# Patient Record
Sex: FEMALE | Race: WHITE | Employment: FULL TIME | URBAN - METROPOLITAN AREA
[De-identification: names, ages, dates, MRNs, and addresses within clinical notes are randomized per-mention and may not be internally consistent; named-entity substitution may affect disease eponyms.]

---

## 2021-07-21 ENCOUNTER — OFFICE VISIT (OUTPATIENT)
Dept: FAMILY MEDICINE CLINIC | Age: 41
End: 2021-07-21
Payer: COMMERCIAL

## 2021-07-21 VITALS
HEIGHT: 65 IN | RESPIRATION RATE: 15 BRPM | DIASTOLIC BLOOD PRESSURE: 84 MMHG | OXYGEN SATURATION: 98 % | HEART RATE: 72 BPM | WEIGHT: 164 LBS | SYSTOLIC BLOOD PRESSURE: 126 MMHG | TEMPERATURE: 98 F | BODY MASS INDEX: 27.32 KG/M2

## 2021-07-21 DIAGNOSIS — B36.0 TINEA VERSICOLOR: ICD-10-CM

## 2021-07-21 DIAGNOSIS — Z12.31 ENCOUNTER FOR SCREENING MAMMOGRAM FOR MALIGNANT NEOPLASM OF BREAST: ICD-10-CM

## 2021-07-21 DIAGNOSIS — Z72.0 TOBACCO USE: ICD-10-CM

## 2021-07-21 DIAGNOSIS — J45.20 MILD INTERMITTENT ASTHMA, UNSPECIFIED WHETHER COMPLICATED: ICD-10-CM

## 2021-07-21 DIAGNOSIS — J30.81 ALLERGIC RHINITIS DUE TO ANIMAL HAIR AND DANDER: Primary | ICD-10-CM

## 2021-07-21 PROCEDURE — 99204 OFFICE O/P NEW MOD 45 MIN: CPT | Performed by: INTERNAL MEDICINE

## 2021-07-21 RX ORDER — ALBUTEROL SULFATE 90 UG/1
1 AEROSOL, METERED RESPIRATORY (INHALATION) EVERY 6 HOURS PRN
Qty: 1 INHALER | Refills: 1 | Status: SHIPPED | OUTPATIENT
Start: 2021-07-21

## 2021-07-21 RX ORDER — FLUTICASONE PROPIONATE 110 UG/1
2 AEROSOL, METERED RESPIRATORY (INHALATION) DAILY PRN
Qty: 1 INHALER | Refills: 1 | Status: SHIPPED | OUTPATIENT
Start: 2021-07-21 | End: 2022-07-26

## 2021-07-21 RX ORDER — LEVOCETIRIZINE DIHYDROCHLORIDE 5 MG/1
5 TABLET, FILM COATED ORAL NIGHTLY PRN
Qty: 90 TABLET | Refills: 3 | Status: SHIPPED | OUTPATIENT
Start: 2021-07-21

## 2021-07-21 SDOH — ECONOMIC STABILITY: FOOD INSECURITY: WITHIN THE PAST 12 MONTHS, THE FOOD YOU BOUGHT JUST DIDN'T LAST AND YOU DIDN'T HAVE MONEY TO GET MORE.: NEVER TRUE

## 2021-07-21 SDOH — ECONOMIC STABILITY: FOOD INSECURITY: WITHIN THE PAST 12 MONTHS, YOU WORRIED THAT YOUR FOOD WOULD RUN OUT BEFORE YOU GOT MONEY TO BUY MORE.: NEVER TRUE

## 2021-07-21 ASSESSMENT — SOCIAL DETERMINANTS OF HEALTH (SDOH): HOW HARD IS IT FOR YOU TO PAY FOR THE VERY BASICS LIKE FOOD, HOUSING, MEDICAL CARE, AND HEATING?: NOT HARD AT ALL

## 2021-07-21 ASSESSMENT — PATIENT HEALTH QUESTIONNAIRE - PHQ9
SUM OF ALL RESPONSES TO PHQ QUESTIONS 1-9: 0
1. LITTLE INTEREST OR PLEASURE IN DOING THINGS: 0
SUM OF ALL RESPONSES TO PHQ QUESTIONS 1-9: 0
SUM OF ALL RESPONSES TO PHQ9 QUESTIONS 1 & 2: 0
2. FEELING DOWN, DEPRESSED OR HOPELESS: 0
SUM OF ALL RESPONSES TO PHQ QUESTIONS 1-9: 0

## 2021-07-21 ASSESSMENT — ENCOUNTER SYMPTOMS
ABDOMINAL PAIN: 0
BACK PAIN: 0
EYE PAIN: 0
SHORTNESS OF BREATH: 0

## 2021-07-21 NOTE — PROGRESS NOTES
Subjective:      Patient ID: Sisi Cedeno is a 42 y.o. female who presents today with:  Chief Complaint   Patient presents with   [address]     no PCP noted since childhood        HPI    Seasonal allergies  No longer responsive to zyrtec and claritin  And flonase and allegra don't work wel either  She has intermittent asthma thought 2/2 to pets  She might take a rescue once a month or every 3 weeks. Past Medical History:   Diagnosis Date    Mild intermittent asthma without complication     Tinea versicolor     TMJ (dislocation of temporomandibular joint)      Past Surgical History:   Procedure Laterality Date    BREAST ENHANCEMENT SURGERY      HERNIA REPAIR      HYSTERECTOMY, VAGINAL      WISDOM TOOTH EXTRACTION       Social History     Socioeconomic History    Marital status:      Spouse name: Not on file    Number of children: Not on file    Years of education: Not on file    Highest education level: Not on file   Occupational History    Not on file   Tobacco Use    Smoking status: Current Every Day Smoker     Packs/day: 1.00     Years: 8.00     Pack years: 8.00     Types: Cigarettes    Smokeless tobacco: Never Used   Vaping Use    Vaping Use: Never assessed   Substance and Sexual Activity    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    Drug use: No    Sexual activity: Not Currently     Partners: Male     Comment:     Other Topics Concern    Not on file   Social History Narrative    Not on file     Social Determinants of Health     Financial Resource Strain: Low Risk     Difficulty of Paying Living Expenses: Not hard at all   Food Insecurity: No Food Insecurity    Worried About Running Out of Food in the Last Year: Never true    Roberto of Food in the Last Year: Never true   Transportation Needs:     Lack of Transportation (Medical):      Lack of Transportation (Non-Medical):    Physical Activity:     Days of Exercise per Week:     Minutes of Exercise per Session:    Stress:     Feeling of Stress :    Social Connections:     Frequency of Communication with Friends and Family:     Frequency of Social Gatherings with Friends and Family:     Attends [employer] Services:     Active Member of Clubs or Organizations:     Attends Club or Organization Meetings:     Marital Status:    Intimate Partner Violence:     Fear of Current or Ex-Partner:     Emotionally Abused:     Physically Abused:     Sexually Abused: Allergies   Allergen Reactions    Sulfa Antibiotics     Tramadol      She doesn't think she's allergic      Current Outpatient Medications on File Prior to Visit   Medication Sig Dispense Refill    Multiple Vitamin (MULTIVITAMIN ADULT PO) Take by mouth       No current facility-administered medications on file prior to visit. I have personally reviewed the ROS, PMH, PFH, and social history     Review of Systems   Constitutional: Negative for chills and fever. HENT: Negative for congestion. Eyes: Negative for pain. Respiratory: Negative for shortness of breath. Cardiovascular: Negative for chest pain. Gastrointestinal: Negative for abdominal pain. Genitourinary: Negative for hematuria. Musculoskeletal: Negative for back pain. Allergic/Immunologic: Negative for immunocompromised state. Neurological: Negative for headaches. Psychiatric/Behavioral: Negative for hallucinations. Objective:   /84 (Site: Right Upper Arm, Position: Sitting, Cuff Size: Large Adult)   Pulse 72   Temp 98 °F (36.7 °C) (Tympanic)   Resp 15   Ht 5' 5\" (1.651 m)   Wt 164 lb (74.4 kg)   SpO2 98%   BMI 27.29 kg/m²     Physical Exam  Constitutional:       General: She is not in acute distress. Appearance: Normal appearance. She is not ill-appearing, toxic-appearing or diaphoretic. HENT:      Head: Normocephalic. Neck:      Vascular: No carotid bruit. Cardiovascular:      Rate and Rhythm: Normal rate and regular rhythm. Pulses: Normal pulses. Heart sounds: Normal heart sounds. No murmur heard. No friction rub. No gallop. Pulmonary:      Effort: Pulmonary effort is normal. No respiratory distress. Breath sounds: Normal breath sounds. No wheezing, rhonchi or rales. Abdominal:      General: Abdomen is flat. There is no distension. Palpations: Abdomen is soft. Tenderness: There is no abdominal tenderness. There is no right CVA tenderness, left CVA tenderness, guarding or rebound. Musculoskeletal:      Cervical back: Neck supple. Right lower leg: No edema. Left lower leg: No edema. Skin:     General: Skin is warm. Findings: No erythema or rash. Comments: Scattered hypo pigmented macular rash over back, chest, forearm (on right)    Neurological:      Mental Status: She is alert. Psychiatric:         Mood and Affect: Mood normal.           Assessment:       Diagnosis Orders   1. Allergic rhinitis due to animal hair and dander     2. Mild intermittent asthma, unspecified whether complicated     3. Tinea versicolor           Plan:     vc  Add ics, consider spirometry soon  See orders  Verify fh of her father please. Nicotine patches, wellbutrin, chantix. Discuss at follow up   Start wellbutrin. No orders of the defined types were placed in this encounter. No orders of the defined types were placed in this encounter. On the fence about the covid vaccine, recommend it. Wash mouth after using  Risk of thrush and pneumonia. If rash doesn't improve in 2 weeks call, Sooner if it worsens. If anything should change or worsen call ASAP, don't wait for next scheduled appointment. Return in about 6 weeks (around 9/1/2021) for worsening symptoms, call ASAP for appointment, Chronic condition management/appointment.       Fabio Bob MD

## 2021-07-24 DIAGNOSIS — B36.0 TINEA VERSICOLOR: ICD-10-CM

## 2021-07-25 NOTE — TELEPHONE ENCOUNTER
Rx requested:  Requested Prescriptions     Pending Prescriptions Disp Refills    Terbinafine HCl 1 % SOLN 1 Bottle 0     Sig: Topical terbinafine 1% solution applied twice daily for one week       Last Office Visit:   7/21/2021        Next Visit Date:  Future Appointments   Date Time Provider Cyndie Zheng   9/1/2021 10:45 AM Mary Alvarenga MD [facility]   9/11/2021 10:20 AM [facility]

## 2021-07-26 DIAGNOSIS — B36.0 TINEA VERSICOLOR: ICD-10-CM

## 2021-08-11 RX ORDER — BUPROPION HYDROCHLORIDE 75 MG/1
75 TABLET ORAL DAILY
Qty: 21 TABLET | Refills: 0 | Status: SHIPPED | OUTPATIENT
Start: 2021-08-11 | End: 2021-09-01 | Stop reason: DRUGHIGH

## 2021-09-01 ENCOUNTER — OFFICE VISIT (OUTPATIENT)
Dept: FAMILY MEDICINE CLINIC | Age: 41
End: 2021-09-01
Payer: COMMERCIAL

## 2021-09-01 VITALS
SYSTOLIC BLOOD PRESSURE: 125 MMHG | OXYGEN SATURATION: 97 % | BODY MASS INDEX: 27.32 KG/M2 | RESPIRATION RATE: 15 BRPM | WEIGHT: 164 LBS | HEIGHT: 65 IN | HEART RATE: 67 BPM | TEMPERATURE: 98.4 F | DIASTOLIC BLOOD PRESSURE: 81 MMHG

## 2021-09-01 DIAGNOSIS — R05.9 COUGH: ICD-10-CM

## 2021-09-01 DIAGNOSIS — Z12.72 SCREENING FOR VAGINAL CANCER: ICD-10-CM

## 2021-09-01 DIAGNOSIS — Z72.0 TOBACCO USE: Primary | ICD-10-CM

## 2021-09-01 DIAGNOSIS — H81.11 BENIGN PAROXYSMAL POSITIONAL VERTIGO OF RIGHT EAR: ICD-10-CM

## 2021-09-01 DIAGNOSIS — Z20.822 COVID-19 RULED OUT BY LABORATORY TESTING: ICD-10-CM

## 2021-09-01 DIAGNOSIS — J02.9 SORE THROAT: ICD-10-CM

## 2021-09-01 DIAGNOSIS — J45.20 MILD INTERMITTENT ASTHMA, UNSPECIFIED WHETHER COMPLICATED: ICD-10-CM

## 2021-09-01 PROCEDURE — 99214 OFFICE O/P EST MOD 30 MIN: CPT | Performed by: INTERNAL MEDICINE

## 2021-09-01 RX ORDER — PREDNISONE 10 MG/1
TABLET ORAL
Qty: 21 TABLET | Refills: 0 | Status: SHIPPED | OUTPATIENT
Start: 2021-09-01 | End: 2022-02-03 | Stop reason: CLARIF

## 2021-09-01 RX ORDER — MECLIZINE HYDROCHLORIDE 25 MG/1
25 TABLET ORAL EVERY 8 HOURS PRN
Qty: 30 TABLET | Refills: 0 | Status: SHIPPED | OUTPATIENT
Start: 2021-09-01 | End: 2021-09-11

## 2021-09-01 RX ORDER — PRENATAL VIT 91/IRON/FOLIC/DHA 28-975-200
COMBINATION PACKAGE (EA) ORAL
COMMUNITY
Start: 2021-07-21 | End: 2021-09-01

## 2021-09-01 RX ORDER — BUPROPION HYDROCHLORIDE 150 MG/1
150 TABLET ORAL EVERY MORNING
Qty: 30 TABLET | Refills: 1 | Status: SHIPPED | OUTPATIENT
Start: 2021-09-01 | End: 2021-09-03 | Stop reason: SDUPTHER

## 2021-09-01 ASSESSMENT — ENCOUNTER SYMPTOMS
BACK PAIN: 0
SHORTNESS OF BREATH: 0
EYE PAIN: 0
ABDOMINAL PAIN: 0

## 2021-09-01 NOTE — PROGRESS NOTES
Subjective:      Patient ID: Sisi Melvin is a 42 y.o. female who presents today with:  Chief Complaint   Patient presents with    Follow-up    Rash     mnetions rash is better     Discuss Medications    Flu Vaccine     declined     Pharyngitis     patient notes a raw feeling         HPI     Rash is lighter/better but nto complete gone  REFUSED FLU shot 2021   Sporadically coughing  Throat is raw   Past Medical History:   Diagnosis Date    Mild intermittent asthma without complication     Tinea versicolor     TMJ (dislocation of temporomandibular joint)      Past Surgical History:   Procedure Laterality Date    BREAST ENHANCEMENT SURGERY      HERNIA REPAIR      HYSTERECTOMY, VAGINAL      WISDOM TOOTH EXTRACTION       Social History     Socioeconomic History    Marital status:      Spouse name: Not on file    Number of children: Not on file    Years of education: Not on file    Highest education level: Not on file   Occupational History    Not on file   Tobacco Use    Smoking status: Current Every Day Smoker     Packs/day: 1.00     Years: 8.00     Pack years: 8.00     Types: Cigarettes    Smokeless tobacco: Never Used   Vaping Use    Vaping Use: Never assessed   Substance and Sexual Activity    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    Drug use: No    Sexual activity: Not Currently     Partners: Male     Comment:     Other Topics Concern    Not on file   Social History Narrative    Not on file     Social Determinants of Health     Financial Resource Strain: Low Risk     Difficulty of Paying Living Expenses: Not hard at all   Food Insecurity: No Food Insecurity    Worried About Running Out of Food in the Last Year: Never true    Roberto of Food in the Last Year: Never true   Transportation Needs:     Lack of Transportation (Medical):      Lack of Transportation (Non-Medical):    Physical Activity:     Days of Exercise per Week:     Minutes of Exercise per Session:    Stress:     Feeling of Stress :    Social Connections:     Frequency of Communication with Friends and Family:     Frequency of Social Gatherings with Friends and Family:     Attends Spiritism Services:     Active Member of Clubs or Organizations:     Attends Club or Organization Meetings:     Marital Status:    Intimate Partner Violence:     Fear of Current or Ex-Partner:     Emotionally Abused:     Physically Abused:     Sexually Abused: Allergies   Allergen Reactions    Sulfa Antibiotics     Tramadol      She doesn't think she's allergic      Current Outpatient Medications on File Prior to Visit   Medication Sig Dispense Refill    Terbinafine HCl 1 % SOLN Topical terbinafine 1% solution applied twice daily for one week 1 Bottle 1    Multiple Vitamin (MULTIVITAMIN ADULT PO) Take by mouth      levocetirizine (XYZAL) 5 MG tablet Take 1 tablet by mouth nightly as needed (allergies) 90 tablet 3    albuterol sulfate HFA (VENTOLIN HFA) 108 (90 Base) MCG/ACT inhaler Inhale 1 puff into the lungs every 6 hours as needed for Wheezing or Shortness of Breath 1 Inhaler 1    fluticasone (FLOVENT HFA) 110 MCG/ACT inhaler Inhale 2 puffs into the lungs daily as needed (wheezing or shortness of breath) 1 Inhaler 1     No current facility-administered medications on file prior to visit. I have personally reviewed the ROS, PMH, PFH, and social history     Review of Systems   Constitutional: Negative for chills and fever. HENT: Negative for congestion. Eyes: Negative for pain. Respiratory: Negative for shortness of breath. Cardiovascular: Negative for chest pain. Gastrointestinal: Negative for abdominal pain. Genitourinary: Negative for hematuria. Musculoskeletal: Negative for back pain. Allergic/Immunologic: Negative for immunocompromised state. Neurological: Negative for headaches. Psychiatric/Behavioral: Negative for hallucinations.        Objective:   /81 (Site: Left Upper Arm, Position: Sitting, Cuff Size: Large Adult)   Pulse 67   Temp 98.4 °F (36.9 °C) (Tympanic)   Resp 15   Ht 5' 5\" (1.651 m)   Wt 164 lb (74.4 kg)   SpO2 97%   BMI 27.29 kg/m²     Physical Exam  Constitutional:       General: She is not in acute distress. Appearance: Normal appearance. She is not ill-appearing, toxic-appearing or diaphoretic. HENT:      Head: Normocephalic. Mouth/Throat:      Comments: Uvula is midline and mucous membranes are normal. Posterior oropharynx without erythema. No oropharyngeal exudate, posterior oropharyngeal edema or tonsillar abscesses. Floor of mouth soft. Neck:      Vascular: No carotid bruit. Cardiovascular:      Rate and Rhythm: Normal rate and regular rhythm. Pulses: Normal pulses. Heart sounds: Normal heart sounds. No murmur heard. No friction rub. No gallop. Pulmonary:      Effort: Pulmonary effort is normal. No respiratory distress. Breath sounds: Normal breath sounds. No wheezing, rhonchi or rales. Abdominal:      General: Abdomen is flat. There is no distension. Palpations: Abdomen is soft. Tenderness: There is no abdominal tenderness. There is no right CVA tenderness, left CVA tenderness, guarding or rebound. Musculoskeletal:      Cervical back: Neck supple. Right lower leg: No edema. Left lower leg: No edema. Skin:     General: Skin is warm. Findings: No erythema or rash. Neurological:      Mental Status: She is alert. Cranial Nerves: No cranial nerve deficit. Comments: + LISETTE Mathews pike on right    Psychiatric:         Mood and Affect: Mood normal.           Assessment:       Diagnosis Orders   1. Tobacco use     2. Mild intermittent asthma, unspecified whether complicated     3. Sore throat  XR CHEST (2 VW)    Culture, Throat    POCT COVID-19, Antigen   4.  Cough  XR CHEST (2 VW)   5. Screening for vaginal cancer  Marcelo Culp MD, OB-GYN, [address]: vc  REFUSED covid vaccine 2021   Increase wellbutrin. Trying to verify FH of her dad. Asthma better, taking prn ics (1x per week she needs albuterol)    Trial of meclizine           Orders Placed This Encounter   Procedures    Culture, Throat     Standing Status:   Future     Standing Expiration Date:   9/1/2022    XR CHEST (2 VW)     Standing Status:   Future     Standing Expiration Date:   9/1/2022   Joesph Cruz MD, OB-GYN, [place]     Referral Priority:   Routine     Referral Type:   Eval and Treat     Referral Reason:   Specialty Services Required     Referred to Provider:   Della Hoff MD     Requested Specialty:   Obstetrics & Gynecology     Number of Visits Requested:   1    POCT COVID-19, Antigen     Order Specific Question:   Is this test for diagnosis or screening? Answer:   Diagnosis of ill patient     Order Specific Question:   Symptomatic for COVID-19 as defined by CDC? Answer:   Yes     Order Specific Question:   Date of Symptom Onset     Answer:   8/31/2021     Order Specific Question:   Hospitalized for COVID-19? Answer:   No     Order Specific Question:   Admitted to ICU for COVID-19? Answer:   No     Order Specific Question:   Employed in healthcare setting? Answer:   No     Order Specific Question:   Resident in a congregate (group) care setting? Answer:   No     Order Specific Question:   Pregnant? Answer:   No     Order Specific Question:   Previously tested for COVID-19? Answer:   No     Orders Placed This Encounter   Medications    buPROPion (WELLBUTRIN XL) 150 MG extended release tablet     Sig: Take 1 tablet by mouth every morning     Dispense:  30 tablet     Refill:  1    predniSONE (DELTASONE) 10 MG tablet     Sig: Take 4 tabs po qd x 2 days, then 3 tabs po qd x 2 days, then 2 tabs po qd x 2 days, then 1 tab po qd x 2 days, then 1/2 tab po qd x 2 days.      Dispense:  21 tablet     Refill:  0   Understands SSRI can increase SI and to call immediately if this should occur. No slurred speech, no blurry vision, no new hearing loss, no tinnitus, no focal weakness. No fevers, no chills. If anything should change or worsen call ASAP, don't wait for next scheduled appointment. Return in about 3 months (around 12/1/2021) for Chronic condition management/appointment, worsening symptoms, call ASAP for appointment.       Chidi Purvis MD

## 2021-09-01 NOTE — PATIENT INSTRUCTIONS
Incisional site care: your surgeon stated that site can remain open to air. J-tube site: Your surgeon stated that your site can remain open to air.    Patient Education        Benign Paroxysmal Positional Vertigo (BPPV): Care Instructions  Your Care Instructions     Benign paroxysmal positional vertigo, also called BPPV, is an inner ear problem. It causes a spinning or whirling sensation when you move your head. This sensation is called vertigo. The vertigo usually lasts for less than a minute. People often have vertigo spells for a few days or weeks. Then the vertigo goes away. But it may come back again. The vertigo may be mild, or it may be bad enough to cause unsteadiness, nausea, and vomiting. When you move, your inner ear sends messages to the brain. This helps you keep your balance. Vertigo can happen when debris builds up in the inner ear. The buildup can cause the inner ear to send the wrong message to the brain. Your doctor may move you in different positions to help your vertigo get better faster. This is called the Epley maneuver. Your doctor may also prescribe medicines or exercises to help with your symptoms. Follow-up care is a key part of your treatment and safety. Be sure to make and go to all appointments, and call your doctor if you are having problems. It's also a good idea to know your test results and keep a list of the medicines you take. How can you care for yourself at home? · If your doctor suggests that you do Jennings-Daroff exercises:  ? Sit on the edge of a bed or sofa. Quickly lie down on the side that causes the worst vertigo. Lie on your side with your ear down. ? Stay in this position for at least 30 seconds or until the vertigo goes away. ? Sit up. If this causes vertigo, wait for it to stop. ? Repeat the procedure on the other side. ? Repeat this 10 times. Do these exercises 2 times a day until the vertigo is gone. When should you call for help? Call 911 anytime you think you may need emergency care. For example, call if:    · You have symptoms of a stroke.  These may include:  ? Sudden numbness, tingling, weakness, or loss of movement in your face, arm, or leg, especially on only one side of your body. ? Sudden vision changes. ? Sudden trouble speaking. ? Sudden confusion or trouble understanding simple statements. ? Sudden problems with walking or balance. ? A sudden, severe headache that is different from past headaches. Call your doctor now or seek immediate medical care if:    · You have new or worse nausea and vomiting.     · You have new symptoms such as hearing loss or roaring in your ears. Watch closely for changes in your health, and be sure to contact your doctor if:    · You are not getting better as expected.     · Your vertigo gets worse. Where can you learn more? Go to https://[email or web address].DeliveryChef.[email or web address]. org and sign in to your [employer] account. Enter  in the [employer] box to learn more about \"Benign Paroxysmal Positional Vertigo (BPPV): Care Instructions. \"     If you do not have an account, please click on the \"Sign Up Now\" link. Current as of: December 2, 2020               Content Version: 12.9  © 0794-5869 [employer]. Care instructions adapted under license by [facility] ([facility]). If you have questions about a medical condition or this instruction, always ask your healthcare professional. [place] [address] any warranty or liability for your use of this information.

## 2021-09-03 DIAGNOSIS — F41.9 ANXIETY: Primary | ICD-10-CM

## 2021-09-03 RX ORDER — BUPROPION HYDROCHLORIDE 150 MG/1
150 TABLET ORAL EVERY MORNING
Qty: 30 TABLET | Refills: 1 | Status: SHIPPED | OUTPATIENT
Start: 2021-09-03 | End: 2021-12-02 | Stop reason: SDUPTHER

## 2021-10-04 ENCOUNTER — NURSE TRIAGE (OUTPATIENT)
Dept: OTHER | Facility: CLINIC | Age: 41
End: 2021-10-04

## 2021-10-04 ENCOUNTER — VIRTUAL VISIT (OUTPATIENT)
Dept: FAMILY MEDICINE CLINIC | Age: 41
End: 2021-10-04
Payer: COMMERCIAL

## 2021-10-04 DIAGNOSIS — B96.89 ACUTE BACTERIAL SINUSITIS: Primary | ICD-10-CM

## 2021-10-04 DIAGNOSIS — J01.90 ACUTE BACTERIAL SINUSITIS: Primary | ICD-10-CM

## 2021-10-04 DIAGNOSIS — J01.90 ACUTE SINUSITIS, RECURRENCE NOT SPECIFIED, UNSPECIFIED LOCATION: ICD-10-CM

## 2021-10-04 LAB
INFLUENZA A ANTIBODY: NORMAL
INFLUENZA B ANTIBODY: NORMAL
Lab: NORMAL
PERFORMING INSTRUMENT: NORMAL
QC PASS/FAIL: NORMAL
SARS-COV-2, POC: NORMAL

## 2021-10-04 PROCEDURE — 87426 SARSCOV CORONAVIRUS AG IA: CPT | Performed by: PHYSICIAN ASSISTANT

## 2021-10-04 PROCEDURE — 99441 PR PHYS/QHP TELEPHONE EVALUATION 5-10 MIN: CPT | Performed by: PHYSICIAN ASSISTANT

## 2021-10-04 PROCEDURE — 87804 INFLUENZA ASSAY W/OPTIC: CPT | Performed by: PHYSICIAN ASSISTANT

## 2021-10-04 RX ORDER — AMOXICILLIN 500 MG/1
500 CAPSULE ORAL 2 TIMES DAILY
Qty: 20 CAPSULE | Refills: 0 | Status: SHIPPED | OUTPATIENT
Start: 2021-10-04 | End: 2021-10-14

## 2021-10-04 ASSESSMENT — ENCOUNTER SYMPTOMS
RHINORRHEA: 1
TROUBLE SWALLOWING: 0
BACK PAIN: 0
SHORTNESS OF BREATH: 0
DIARRHEA: 0
ABDOMINAL PAIN: 0
CHEST TIGHTNESS: 0
SINUS PRESSURE: 0
SORE THROAT: 1
VOMITING: 0
COUGH: 1

## 2021-10-04 ASSESSMENT — PATIENT HEALTH QUESTIONNAIRE - PHQ9
SUM OF ALL RESPONSES TO PHQ QUESTIONS 1-9: 0
2. FEELING DOWN, DEPRESSED OR HOPELESS: 0
SUM OF ALL RESPONSES TO PHQ9 QUESTIONS 1 & 2: 0
SUM OF ALL RESPONSES TO PHQ QUESTIONS 1-9: 0
SUM OF ALL RESPONSES TO PHQ QUESTIONS 1-9: 0
1. LITTLE INTEREST OR PLEASURE IN DOING THINGS: 0

## 2021-10-04 NOTE — PROGRESS NOTES
TELEHEALTH EVALUATION -- Audio/Visual (During PGXWA-13 public health emergency)    -   Moody Alegria is a 42 y.o. female being evaluated by a Virtual Visit (video visit) encounter to address concerns as mentioned above. A caregiver was present when appropriate. Due to this being a TeleHealth encounter (During WZKCI-21 public health emergency), evaluation of the following organ systems was limited: Vitals/Constitutional/EENT/Resp/CV/GI//MS/Neuro/Skin/Heme-Lymph-Imm. Pursuant to the emergency declaration under the [address] [place] [address], [address] [place] [address] authority and the [employer] Act, this Virtual Visit was conducted with patient's (and/or legal guardian's) consent, to reduce the patient's risk of exposure to COVID-19 and provide necessary medical care. The patient (and/or legal guardian) has also been advised to contact this office for worsening conditions or problems, and seek emergency medical treatment and/or call 911 if deemed necessary. Patient was contacted and agreed to proceed with a virtual visit via Telephone Visit  The risks and benefits of converting to a virtual visit were discussed in light of the current infectious disease epidemic. Patient also understood that insurance coverage and co-pays are up to their individual insurance plans. Patient was located at their home. Provider was located at their office. 10/4/2021  Verona Becerra (:  1980) has requested an audio/video evaluation for the following concern(s):    HPI  42year old female who presents with complaints of sore throat, nasal congestion, body aches, dizzy, shortness of breath on exertion and a fever with a T-Max of 102. 1. states has had these symptoms since Friday. Review of Systems   Constitutional: Positive for fatigue and fever.  Negative for activity change, appetite change, chills and unexpected weight change. HENT: Positive for congestion, rhinorrhea and sore throat. Negative for drooling, ear pain, nosebleeds, sinus pressure and trouble swallowing. Respiratory: Positive for cough. Negative for chest tightness and shortness of breath. Cardiovascular: Negative for chest pain and leg swelling. Gastrointestinal: Negative for abdominal pain, diarrhea and vomiting. Endocrine: Negative for polydipsia and polyphagia. Genitourinary: Negative for dysuria, flank pain and frequency. Musculoskeletal: Negative for back pain and myalgias. Skin: Negative for pallor and rash. Neurological: Positive for headaches. Negative for syncope and weakness. Hematological: Does not bruise/bleed easily. Psychiatric/Behavioral: Negative for agitation, behavioral problems and confusion. All other systems reviewed and are negative. Prior to Visit Medications    Medication Sig Taking? Authorizing Provider   amoxicillin (AMOXIL) 500 MG capsule Take 1 capsule by mouth 2 times daily for 10 days Yes DIANELYS Guerra   buPROPion (WELLBUTRIN XL) 150 MG extended release tablet Take 1 tablet by mouth every morning Yes Oumar Pino MD   predniSONE (DELTASONE) 10 MG tablet Take 4 tabs po qd x 2 days, then 3 tabs po qd x 2 days, then 2 tabs po qd x 2 days, then 1 tab po qd x 2 days, then 1/2 tab po qd x 2 days.  Yes Oumar Pino MD   Terbinafine HCl 1 % SOLN Topical terbinafine 1% solution applied twice daily for one week Yes Oumar Pino MD   Multiple Vitamin (MULTIVITAMIN ADULT PO) Take by mouth Yes Historical Provider, MD   levocetirizine (XYZAL) 5 MG tablet Take 1 tablet by mouth nightly as needed (allergies) Yes Oumar Pino MD   albuterol sulfate HFA (VENTOLIN HFA) 108 (90 Base) MCG/ACT inhaler Inhale 1 puff into the lungs every 6 hours as needed for Wheezing or Shortness of Breath Yes Oumar Pino MD   fluticasone (FLOVENT HFA) 110 MCG/ACT inhaler Inhale 2 puffs into the lungs daily as needed (wheezing or shortness of breath) Yes Yasir Cade MD       Past Medical History:   Diagnosis Date    Mild intermittent asthma without complication     Tinea versicolor     TMJ (dislocation of temporomandibular joint)      Past Surgical History:   Procedure Laterality Date    BREAST ENHANCEMENT SURGERY      HERNIA REPAIR      HYSTERECTOMY, VAGINAL      WISDOM TOOTH EXTRACTION       Social History     Socioeconomic History    Marital status:      Spouse name: Not on file    Number of children: Not on file    Years of education: Not on file    Highest education level: Not on file   Occupational History    Not on file   Tobacco Use    Smoking status: Current Every Day Smoker     Packs/day: 1.00     Years: 8.00     Pack years: 8.00     Types: Cigarettes    Smokeless tobacco: Never Used   Vaping Use    Vaping Use: Never assessed   Substance and Sexual Activity    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    Drug use: No    Sexual activity: Not Currently     Partners: Male     Comment:     Other Topics Concern    Not on file   Social History Narrative    Not on file     Social Determinants of Health     Financial Resource Strain: Low Risk     Difficulty of Paying Living Expenses: Not hard at all   Food Insecurity: No Food Insecurity    Worried About Running Out of Food in the Last Year: Never true    [address] in the Last Year: Never true   Transportation Needs:     Lack of Transportation (Medical):      Lack of Transportation (Non-Medical):    Physical Activity:     Days of Exercise per Week:     Minutes of Exercise per Session:    Stress:     Feeling of Stress :    Social Connections:     Frequency of Communication with Friends and Family:     Frequency of Social Gatherings with Friends and Family:     Attends [employer] Services:     Active Member of Clubs or Organizations:     Attends Club or Organization Meetings:     Marital Status: Abnormal-     Other pertinent observable physical exam findings-   Results for orders placed or performed in visit on 09/01/21   Culture, Throat    Specimen: Throat   Result Value Ref Range    Throat Culture Usual respiratory bib in 48 hours    Vitamin D 25 Hydroxy   Result Value Ref Range    Vit D, 25-Hydroxy 43.3 30.0 - 100.0 ng/mL   TSH with Reflex   Result Value Ref Range    TSH 1.030 0.440 - 3.860 uIU/mL   Lipid Panel   Result Value Ref Range    Cholesterol, Total 160 0 - 199 mg/dL    Triglycerides 63 0 - 150 mg/dL    HDL 55 40 - 59 mg/dL    LDL Calculated 92 0 - 129 mg/dL   Hemoglobin A1C   Result Value Ref Range    Hemoglobin A1C 5.6 4.8 - 5.9 %   Comprehensive Metabolic Panel   Result Value Ref Range    Sodium 143 135 - 144 mEq/L    Potassium 3.9 3.4 - 4.9 mEq/L    Chloride 105 95 - 107 mEq/L    CO2 26 20 - 31 mEq/L    Anion Gap 12 9 - 15 mEq/L    Glucose 84 70 - 99 mg/dL    BUN 10 6 - 20 mg/dL    CREATININE 0.58 0.50 - 0.90 mg/dL    GFR Non-African American >60.0 >60    GFR  >60.0 >60    Calcium 9.9 8.5 - 9.9 mg/dL    Total Protein 6.9 6.3 - 8.0 g/dL    Albumin 4.5 3.5 - 4.6 g/dL    Total Bilirubin <0.2 0.2 - 0.7 mg/dL    Alkaline Phosphatase 80 40 - 130 U/L    ALT 13 0 - 33 U/L    AST 13 0 - 35 U/L    Globulin 2.4 2.3 - 3.5 g/dL   CBC Auto Differential   Result Value Ref Range    WBC 6.1 4.8 - 10.8 K/uL    RBC 4.59 4.20 - 5.40 M/uL    Hemoglobin 13.7 12.0 - 16.0 g/dL    Hematocrit 40.4 37.0 - 47.0 %    MCV 88.1 82.0 - 100.0 fL    MCH 29.9 27.0 - 31.3 pg    MCHC 34.0 33.0 - 37.0 %    RDW 13.2 11.5 - 14.5 %    Platelets 209 008 - 171 K/uL    Neutrophils % 64.4 %    Lymphocytes % 26.5 %    Monocytes % 6.7 %    Eosinophils % 1.6 %    Basophils % 0.8 %    Neutrophils Absolute 3.9 1.4 - 6.5 K/uL    Lymphocytes Absolute 1.6 1.0 - 4.8 K/uL    Monocytes Absolute 0.4 0.2 - 0.8 K/uL    Eosinophils Absolute 0.1 0.0 - 0.7 K/uL    Basophils Absolute 0.0 0.0 - 0.2 K/uL   COVID-19   Result Value Ref Range    SARS-CoV-2, PCR Not Detected Not Detected       ASSESSMENT/PLAN:  Assessment & Plan   Jona Lara was seen today for pharyngitis, nasal congestion and fever. Diagnoses and all orders for this visit:    Acute bacterial sinusitis  -     POCT COVID-19, Antigen  -     POCT Influenza A/B  -     amoxicillin (AMOXIL) 500 MG capsule; Take 1 capsule by mouth 2 times daily for 10 days    Acute sinusitis, recurrence not specified, unspecified location  -     POCT COVID-19, Antigen  -     POCT Influenza A/B  -     amoxicillin (AMOXIL) 500 MG capsule; Take 1 capsule by mouth 2 times daily for 10 days      Orders Placed This Encounter   Procedures    POCT COVID-19, Antigen     Order Specific Question:   Is this test for diagnosis or screening? Answer:   Diagnosis of ill patient     Order Specific Question:   Symptomatic for COVID-19 as defined by CDC? Answer:   Yes     Order Specific Question:   Date of Symptom Onset     Answer:   10/8/2021     Order Specific Question:   Hospitalized for COVID-19? Answer:   No     Order Specific Question:   Admitted to ICU for COVID-19? Answer:   No     Order Specific Question:   Employed in healthcare setting? Answer:   No     Order Specific Question:   Resident in a congregate (group) care setting? Answer:   No     Order Specific Question:   Pregnant? Answer:   No     Order Specific Question:   Previously tested for COVID-19? Answer: Yes    POCT Influenza A/B     Orders Placed This Encounter   Medications    amoxicillin (AMOXIL) 500 MG capsule     Sig: Take 1 capsule by mouth 2 times daily for 10 days     Dispense:  20 capsule     Refill:  0     There are no discontinued medications. Return if symptoms worsen or fail to improve. Reviewed with the patient: current clinical status, medications, activities and diet.      Side effects, adverse effects of the medication prescribed today, as well as treatment plan/ rationale and result expectations have been discussed with the patient who expresses understanding and desires to proceed. Close follow up to evaluate treatment results and for coordination of care. I have reviewed the patient's medical history in detail and updated the computerized patient record. Patient identification was verified at the start of the visit: Yes    Total time spent on this encounter: Not billed by time      --DIANELYS Valdez on 10/4/2021 at 6:00 PM    An electronic signature was used to authenticate this note.

## 2021-10-04 NOTE — TELEPHONE ENCOUNTER
[facility] [address] [facility]. Brief description of triage: flu like symptoms, negative covid test at urgent care today. Triage indicates for patient to be seen tomorrow. Care advice provided, patient verbalizes understanding; denies any other questions or concerns; instructed to call back for any new or worsening symptoms. Writer provided warm transfer to [facility] for appointment scheduling. Patient disconnected prior to transfer, [facility] states will call patient. Attention Provider: Thank you for allowing me to participate in the care of your patient. The patient was connected to triage in response to information provided to the [facility]. Please do not respond through this encounter as the response is not directed to a shared pool. Reason for Disposition   Patient wants to be seen    Answer Assessment - Initial Assessment Questions  1. WORST SYMPTOM: \"What is your worst symptom? \" (e.g., cough, runny nose, muscle aches, headache, sore throat, fever)       Dizzy    2. ONSET: \"When did your flu symptoms start? \"       Friday evening 9/30/2021    3. COUGH: \"How bad is the cough? \"        Yes, dry    4. RESPIRATORY DISTRESS: \"Describe your breathing. \"       Yes, with activity    5. FEVER: \"Do you have a fever? \" If so, ask: \"What is your temperature, how was it measured, and when did it start? \"      101.2 this am forehead thermometer, 99.3 at urgent care    6. EXPOSURE: \"Were you exposed to someone with influenza? \"        3 group members positive, together on 9/20/2021    7. FLU VACCINE: \"Did you get a flu shot this year? \"      Denies    8. HIGH RISK DISEASE: \"Do you any chronic medical problems? \" (e.g., heart or lung disease, asthma, weak immune system, or other HIGH RISK conditions)      Denies    9. PREGNANCY: \"Is there any chance you are pregnant? \" \"When was your last menstrual period? \"      Hysterectomy    10.  OTHER SYMPTOMS: \"Do you have any other symptoms? \"  (e.g., runny nose, muscle aches, headache, sore throat)        Denies    Protocols used: INFLUENZA - SEASONAL-ADULT-OH

## 2021-12-02 DIAGNOSIS — F41.9 ANXIETY: ICD-10-CM

## 2021-12-02 RX ORDER — BUPROPION HYDROCHLORIDE 150 MG/1
150 TABLET ORAL EVERY MORNING
Qty: 30 TABLET | Refills: 1 | Status: SHIPPED | OUTPATIENT
Start: 2021-12-02 | End: 2022-02-03 | Stop reason: DRUGHIGH

## 2022-02-03 ENCOUNTER — VIRTUAL VISIT (OUTPATIENT)
Dept: FAMILY MEDICINE CLINIC | Age: 42
End: 2022-02-03
Payer: COMMERCIAL

## 2022-02-03 DIAGNOSIS — Z72.0 TOBACCO USE: ICD-10-CM

## 2022-02-03 DIAGNOSIS — R05.9 COUGH: Primary | ICD-10-CM

## 2022-02-03 PROCEDURE — 99213 OFFICE O/P EST LOW 20 MIN: CPT | Performed by: INTERNAL MEDICINE

## 2022-02-03 RX ORDER — BUPROPION HYDROCHLORIDE 300 MG/1
300 TABLET ORAL EVERY MORNING
Qty: 90 TABLET | Refills: 1 | Status: SHIPPED | OUTPATIENT
Start: 2022-02-03 | End: 2022-07-26 | Stop reason: ALTCHOICE

## 2022-02-03 ASSESSMENT — PATIENT HEALTH QUESTIONNAIRE - PHQ9
SUM OF ALL RESPONSES TO PHQ QUESTIONS 1-9: 0
SUM OF ALL RESPONSES TO PHQ QUESTIONS 1-9: 0
2. FEELING DOWN, DEPRESSED OR HOPELESS: 0
1. LITTLE INTEREST OR PLEASURE IN DOING THINGS: 0
SUM OF ALL RESPONSES TO PHQ QUESTIONS 1-9: 0
SUM OF ALL RESPONSES TO PHQ QUESTIONS 1-9: 0
SUM OF ALL RESPONSES TO PHQ9 QUESTIONS 1 & 2: 0

## 2022-02-03 NOTE — PATIENT INSTRUCTIONS
Patient Education        bupropion  Pronunciation:  byoo PRO pee on  Brand:  Aplenzin, Forfivo XL, Wellbutrin SR, Wellbutrin XL, Zyban Advantage Pack  What is the most important information I should know about bupropion? You should not take bupropion if you have seizures or an eating disorder, or if you have suddenly stopped using alcohol, seizure medication, or sedatives. If you take Wellbutrin for depression, do not also take Zyban to quit smoking. Do not use bupropion within 14 days before or 14 days after you have used an MAO inhibitor, such as isocarboxazid, linezolid, methylene blue injection, phenelzine, rasagiline, selegiline, or tranylcypromine. Some young people have thoughts about suicide when first taking an antidepressant. Stay alert to changes in your mood or symptoms. Report any new or worsening symptoms to your doctor. What is bupropion? Bupropion is an antidepressant used to treat major depressive disorder and seasonal affective disorder. The Zyban brand of bupropion is used to help people stop smoking by reducing cravings and other withdrawal effects. Bupropion may also be used for purposes not listed in this medication guide. What should I discuss with my healthcare provider before taking bupropion? You should not take bupropion if you are allergic to it, or if you have:  · a seizure disorder;  · an eating disorder such as anorexia or bulimia; or  · if you have suddenly stopped using alcohol, seizure medication, or a sedative (such as Xanax, Valium, Fiorinal, Klonopin, and others). Do not use an MAO inhibitor within 14 days before or 14 days after you take bupropion. A dangerous drug interaction could occur. MAO inhibitors include isocarboxazid, linezolid, phenelzine, rasagiline, selegiline, and tranylcypromine. Do not take bupropion to treat more than one condition at a time. If you take bupropion for depression, do not also take this medicine to quit smoking.    Bupropion may cause seizures, especially if you have certain medical conditions or use certain drugs. Tell your doctor about all of your medical conditions and the drugs you use. Tell your doctor if you have ever had:  · a head injury, seizures, or brain or spinal cord tumor;  · narrow-angle glaucoma;  · heart disease, high blood pressure, or a heart attack;  · diabetes;  · kidney or liver disease (especially cirrhosis);  · depression, bipolar disorder, or other mental illness; or  · if you drink alcohol. Some young people have thoughts about suicide when first taking an antidepressant. Your doctor will need to check your progress at regular visits. Your family or other caregivers should also be alert to changes in your mood or symptoms. Ask your doctor about taking this medicine if you are pregnant. It is not known whether bupropion will harm an unborn baby. However, you may have a relapse of depression if you stop taking your antidepressant. Tell your doctor right away if you become pregnant. Do not start or stop taking bupropion without your doctor's advice. If you are pregnant, your name may be listed on a pregnancy registry to track the effects of bupropion on the baby. It may not be safe to breastfeed while using this medicine. Ask your doctor about any risk. Bupropion is not approved for use by anyone younger than 25years old. How should I take bupropion? Follow all directions on your prescription label and read all medication guides or instruction sheets. Your doctor may occasionally change your dose. Use the medicine exactly as directed. Too much of this medicine can increase your risk of a seizure. You may take bupropion with or without food. Swallow the extended-release tablet whole and do not crush, chew, or break it. You should not change your dose or stop using bupropion suddenly, unless you have a seizure while taking this medicine. Stopping suddenly can cause unpleasant withdrawal symptoms.  Ask your doctor how to safely stop using bupropion. If you take Zyban to help you stop smoking, you may continue to smoke for about 1 week after you start the medicine. Set a date to quit smoking during the first 2 weeks of treatment. Talk to your doctor if you have trouble quitting after taking Zyban for 7 to 12 weeks. Your doctor may prescribe a nicotine replacement product (such as patches or gum) to help you stop smoking. Start using the nicotine replacement product on the same day you stop (quit) smoking or using tobacco products. Some people taking bupropion (Wellbutrin or Zyban) have had high blood pressure that is severe, especially when also using a nicotine replacement product (patch or gum). Your blood pressure may need to be checked before and during treatment with bupropion. Read and carefully follow any Instructions for Use provided with your medicine. Ask your doctor or pharmacist if you do not understand these instructions. You may have nicotine withdrawal symptoms when you stop smoking, including: increased appetite, weight gain, trouble sleeping, trouble concentrating, slower heart rate, having the urge to smoke, and feeling anxious, restless, depressed, angry, frustrated, or irritated. These symptoms may occur with or without using medication such as Zyban. Smoking cessation may also cause new or worsening mental health problems, such as depression. This medicine may affect a drug-screening urine test and you may have false results. Tell the laboratory staff that you use bupropion. Store at room temperature away from moisture, heat, and light. What happens if I miss a dose? Skip the missed dose and use your next dose at the regular time. Do not use two doses at one time. What happens if I overdose? Seek emergency medical attention or call the Poison Help line at 1-952.359.6348.  An overdose of bupropion can be fatal.  Overdose symptoms may include muscle stiffness, hallucinations, fast or uneven urination;  · headache, dizziness; or  · muscle or joint pain. This is not a complete list of side effects and others may occur. Call your doctor for medical advice about side effects. You may report side effects to FDA at 5-583-TAO-1731. What other drugs will affect bupropion? You may have a higher risk of seizures if you use certain other medicines while taking bupropion. Many drugs can affect bupropion. This includes prescription and over-the-counter medicines, vitamins, and herbal products. Not all possible interactions are listed here. Tell your doctor about all your current medicines and any medicine you start or stop using. Where can I get more information? Your pharmacist can provide more information about bupropion. Remember, keep this and all other medicines out of the reach of children, never share your medicines with others, and use this medication only for the indication prescribed. Every effort has been made to ensure that the information provided by [address] is accurate, up-to-date, and complete, but no guarantee is made to that effect. Drug information contained herein may be time sensitive. [employer] information has been compiled for use by healthcare practitioners and consumers in the [place] and therefore [employer] does not warrant that uses outside of the [place] are appropriate, unless specifically indicated otherwise. [employer] drug information does not endorse drugs, diagnose patients or recommend therapy. Photos to [employer] drug information is an informational resource designed to assist licensed healthcare practitioners in caring for their patients and/or to serve consumers viewing this service as a supplement to, and not a substitute for, the expertise, skill, knowledge and judgment of healthcare practitioners.  The absence of a warning for a given drug or drug combination in no way should be construed to indicate that the drug or drug combination is safe, effective or appropriate for any given patient. [facility] does not assume any responsibility for any aspect of healthcare administered with the aid of information [facility] provides. The information contained herein is not intended to cover all possible uses, directions, precautions, warnings, drug interactions, allergic reactions, or adverse effects. If you have questions about the drugs you are taking, check with your doctor, nurse or pharmacist.  Copyright 5834-1350 [address]: 24.01. Revision date: 1/27/2020. Care instructions adapted under license by [facility] ([facility]). If you have questions about a medical condition or this instruction, always ask your healthcare professional. [place] [address] any warranty or liability for your use of this information.

## 2022-02-03 NOTE — PROGRESS NOTES
Subjective:      Patient ID: Yulisa Finn is a 36 y.o. female who presents today with:  No chief complaint on file. HPI      Here for follow up   Dizziness is better   Past Medical History:   Diagnosis Date    Mild intermittent asthma without complication     Tinea versicolor     TMJ (dislocation of temporomandibular joint)      Past Surgical History:   Procedure Laterality Date    BREAST ENHANCEMENT SURGERY      HERNIA REPAIR      HYSTERECTOMY, VAGINAL      WISDOM TOOTH EXTRACTION       Social History     Socioeconomic History    Marital status:      Spouse name: Not on file    Number of children: Not on file    Years of education: Not on file    Highest education level: Not on file   Occupational History    Not on file   Tobacco Use    Smoking status: Current Every Day Smoker     Packs/day: 1.00     Years: 8.00     Pack years: 8.00     Types: Cigarettes    Smokeless tobacco: Never Used   Vaping Use    Vaping Use: Not on file   Substance and Sexual Activity    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    Drug use: No    Sexual activity: Not Currently     Partners: Male     Comment:     Other Topics Concern    Not on file   Social History Narrative    Not on file     Social Determinants of Health     Financial Resource Strain: Low Risk     Difficulty of Paying Living Expenses: Not hard at all   Food Insecurity: No Food Insecurity    Worried About Running Out of Food in the Last Year: Never true    Roberto of Food in the Last Year: Never true   Transportation Needs:     Lack of Transportation (Medical): Not on file    Lack of Transportation (Non-Medical):  Not on file   Physical Activity:     Days of Exercise per Week: Not on file    Minutes of Exercise per Session: Not on file   Stress:     Feeling of Stress : Not on file   Social Connections:     Frequency of Communication with Friends and Family: Not on file    Frequency of Social Gatherings with Friends and Family: Not on file    Attends Hinduism Services: Not on file    Active Member of Clubs or Organizations: Not on file    Attends Club or Organization Meetings: Not on file    Marital Status: Not on file   Intimate Partner Violence:     Fear of Current or Ex-Partner: Not on file    Emotionally Abused: Not on file    Physically Abused: Not on file    Sexually Abused: Not on file   Housing Stability:     Unable to Pay for Housing in the Last Year: Not on file    Number of [place] in the Last Year: Not on file    Unstable Housing in the Last Year: Not on file     Allergies   Allergen Reactions    Sulfa Antibiotics     Tramadol      She doesn't think she's allergic      Current Outpatient Medications on File Prior to Visit   Medication Sig Dispense Refill    Multiple Vitamin (MULTIVITAMIN ADULT PO) Take by mouth      levocetirizine (XYZAL) 5 MG tablet Take 1 tablet by mouth nightly as needed (allergies) 90 tablet 3    albuterol sulfate HFA (VENTOLIN HFA) 108 (90 Base) MCG/ACT inhaler Inhale 1 puff into the lungs every 6 hours as needed for Wheezing or Shortness of Breath 1 Inhaler 1    fluticasone (FLOVENT HFA) 110 MCG/ACT inhaler Inhale 2 puffs into the lungs daily as needed (wheezing or shortness of breath) 1 Inhaler 1     No current facility-administered medications on file prior to visit. I have personally reviewed the ROS, PMH, PFH, and social history     Review of Systems    Objective: There were no vitals taken for this visit. Physical Exam  Constitutional:       General: She is not in acute distress. Appearance: She is not ill-appearing, toxic-appearing or diaphoretic. Pulmonary:      Effort: Pulmonary effort is normal. No respiratory distress. Assessment:       Diagnosis Orders   1. Cough  XR CHEST (2 VW)   2. Tobacco use  buPROPion (WELLBUTRIN XL) 300 MG extended release tablet         Plan:      Video  visit done because of coronavirus pandemic. Doxy[employer]me used  explained billeable visit, patient understands and agrees to continue  I was at home and patient was at home during this visit. Refused flu and covid vaccines  Mom with breast cancer, patient will do mammogram  Referred to ob/gyn 9/2021 (vag pap)     Trying to verify Clifton Kim of her dad. (Not done yet)   Increase Wellbutrin. She will contact me if she wants nicotine lozenge   [facility] 09/2023   Asthma better, taking prn ics (1x to at most 2x per week she needs albuterol 2/3/2022)                 Orders Placed This Encounter   Procedures    XR CHEST (2 VW)     Standing Status:   Future     Standing Expiration Date:   8/3/2022     Orders Placed This Encounter   Medications    buPROPion (WELLBUTRIN XL) 300 MG extended release tablet     Sig: Take 1 tablet by mouth every morning     Dispense:  90 tablet     Refill:  1   had a dry cough since  covid   If not going away within the next several weeks then we need cxr and fu  If it worsens then call asap   Understands SSRI can increase SI and to call immediately if this should occur. Patient was offered pregnancy test, she declined, she understands risks of birth defects. Fluticasone is prn  If asthma controls worsens call and start to fluticasone scheduled  Being around her dogs worsens her asthma. If anything should change or worsen call ASAP, don't wait for next scheduled appointment. Return in about 3 months (around 5/3/2022) for Chronic condition management/appointment, worsening symptoms, call ASAP for appointment.       Heri Rosales MD

## 2022-04-12 ENCOUNTER — PATIENT MESSAGE (OUTPATIENT)
Dept: FAMILY MEDICINE CLINIC | Age: 42
End: 2022-04-12

## 2022-04-13 NOTE — TELEPHONE ENCOUNTER
She can take it every other day for now and then we can stop it at her follow up (she may get withdrawal if she doesn't)  Thanks,    Call immediately should something change.      Araceli Bassett

## 2022-04-13 NOTE — TELEPHONE ENCOUNTER
I would prefer she come to walk in clinic to be safe  If she can't or refuses then pend amoxicillin 500 mg tid

## 2022-07-07 ENCOUNTER — OFFICE VISIT (OUTPATIENT)
Dept: FAMILY MEDICINE CLINIC | Age: 42
End: 2022-07-07
Payer: COMMERCIAL

## 2022-07-07 VITALS
HEIGHT: 65 IN | DIASTOLIC BLOOD PRESSURE: 78 MMHG | TEMPERATURE: 98.4 F | SYSTOLIC BLOOD PRESSURE: 104 MMHG | OXYGEN SATURATION: 98 % | WEIGHT: 174.4 LBS | HEART RATE: 64 BPM | BODY MASS INDEX: 29.06 KG/M2 | RESPIRATION RATE: 16 BRPM

## 2022-07-07 DIAGNOSIS — R91.1 PULMONARY NODULE: Primary | ICD-10-CM

## 2022-07-07 DIAGNOSIS — R07.81 RIB PAIN ON LEFT SIDE: Primary | ICD-10-CM

## 2022-07-07 PROCEDURE — 99213 OFFICE O/P EST LOW 20 MIN: CPT | Performed by: PHYSICIAN ASSISTANT

## 2022-07-07 RX ORDER — CYCLOBENZAPRINE HCL 10 MG
10 TABLET ORAL 3 TIMES DAILY PRN
Qty: 30 TABLET | Refills: 0 | Status: SHIPPED | OUTPATIENT
Start: 2022-07-07 | End: 2022-07-17

## 2022-07-07 ASSESSMENT — PATIENT HEALTH QUESTIONNAIRE - PHQ9
8. MOVING OR SPEAKING SO SLOWLY THAT OTHER PEOPLE COULD HAVE NOTICED. OR THE OPPOSITE, BEING SO FIGETY OR RESTLESS THAT YOU HAVE BEEN MOVING AROUND A LOT MORE THAN USUAL: 0
9. THOUGHTS THAT YOU WOULD BE BETTER OFF DEAD, OR OF HURTING YOURSELF: 0
4. FEELING TIRED OR HAVING LITTLE ENERGY: 0
6. FEELING BAD ABOUT YOURSELF - OR THAT YOU ARE A FAILURE OR HAVE LET YOURSELF OR YOUR FAMILY DOWN: 0
10. IF YOU CHECKED OFF ANY PROBLEMS, HOW DIFFICULT HAVE THESE PROBLEMS MADE IT FOR YOU TO DO YOUR WORK, TAKE CARE OF THINGS AT HOME, OR GET ALONG WITH OTHER PEOPLE: 0
SUM OF ALL RESPONSES TO PHQ QUESTIONS 1-9: 0
1. LITTLE INTEREST OR PLEASURE IN DOING THINGS: 0
SUM OF ALL RESPONSES TO PHQ9 QUESTIONS 1 & 2: 0
3. TROUBLE FALLING OR STAYING ASLEEP: 0
7. TROUBLE CONCENTRATING ON THINGS, SUCH AS READING THE NEWSPAPER OR WATCHING TELEVISION: 0
SUM OF ALL RESPONSES TO PHQ QUESTIONS 1-9: 0
SUM OF ALL RESPONSES TO PHQ QUESTIONS 1-9: 0
5. POOR APPETITE OR OVEREATING: 0
2. FEELING DOWN, DEPRESSED OR HOPELESS: 0
SUM OF ALL RESPONSES TO PHQ QUESTIONS 1-9: 0

## 2022-07-07 ASSESSMENT — ENCOUNTER SYMPTOMS
DIARRHEA: 0
SINUS PRESSURE: 0
TROUBLE SWALLOWING: 0
ABDOMINAL PAIN: 0
BACK PAIN: 0
SHORTNESS OF BREATH: 0
VOMITING: 0
CHEST TIGHTNESS: 0
COUGH: 0

## 2022-07-07 NOTE — PROGRESS NOTES
Subjective:      Patient ID: Nash Das is a 36 y.o. female who presents today for:  Chief Complaint   Patient presents with    Breast Pain     states she has pain in her breast that goes from underneath her left breast into her nipple- states it started a week ago- also stated she does have breast implants       HPI  36year old female who presents with complaints of pain under her left breast. States it started one week ago after her  accidentally applied to much pressure to the area. Past Medical History:   Diagnosis Date    Mild intermittent asthma without complication     Tinea versicolor     TMJ (dislocation of temporomandibular joint)      Past Surgical History:   Procedure Laterality Date    BREAST ENHANCEMENT SURGERY      HERNIA REPAIR      HYSTERECTOMY, VAGINAL      WISDOM TOOTH EXTRACTION       Social History     Socioeconomic History    Marital status:      Spouse name: Not on file    Number of children: Not on file    Years of education: Not on file    Highest education level: Not on file   Occupational History    Not on file   Tobacco Use    Smoking status: Current Every Day Smoker     Packs/day: 1.00     Years: 8.00     Pack years: 8.00     Types: Cigarettes    Smokeless tobacco: Never Used   Vaping Use    Vaping Use: Not on file   Substance and Sexual Activity    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    Drug use: No    Sexual activity: Not Currently     Partners: Male     Comment:     Other Topics Concern    Not on file   Social History Narrative    Not on file     Social Determinants of Health     Financial Resource Strain: Low Risk     Difficulty of Paying Living Expenses: Not hard at all   Food Insecurity: No Food Insecurity    Worried About Running Out of Food in the Last Year: Never true    Roberto of Food in the Last Year: Never true   Transportation Needs:     Lack of Transportation (Medical):  Not on file    Lack of Transportation (Non-Medical):  Not on file   Physical Activity:     Days of Exercise per Week: Not on file    Minutes of Exercise per Session: Not on file   Stress:     Feeling of Stress : Not on file   Social Connections:     Frequency of Communication with Friends and Family: Not on file    Frequency of Social Gatherings with Friends and Family: Not on file    Attends Yazdanism Services: Not on file    Active Member of Clubs or Organizations: Not on file    Attends Club or Organization Meetings: Not on file    Marital Status: Not on file   Intimate Partner Violence:     Fear of Current or Ex-Partner: Not on file    Emotionally Abused: Not on file    Physically Abused: Not on file    Sexually Abused: Not on file   Housing Stability:     Unable to Pay for Housing in the Last Year: Not on file    Number of [place] in the Last Year: Not on file    Unstable Housing in the Last Year: Not on file     Family History   Problem Relation Age of Onset    Cancer Mother         breast cancer    Other Sister         NO KNOWN ANEURYSMS IN THE FAMILY    Other Maternal Grandmother         no known congenital heart disease in family     Allergies   Allergen Reactions    Sulfa Antibiotics     Tramadol      She doesn't think she's allergic      Current Outpatient Medications   Medication Sig Dispense Refill    cyclobenzaprine (FLEXERIL) 10 MG tablet Take 1 tablet by mouth 3 times daily as needed for Muscle spasms 30 tablet 0    Multiple Vitamin (MULTIVITAMIN ADULT PO) Take by mouth      levocetirizine (XYZAL) 5 MG tablet Take 1 tablet by mouth nightly as needed (allergies) 90 tablet 3    albuterol sulfate HFA (VENTOLIN HFA) 108 (90 Base) MCG/ACT inhaler Inhale 1 puff into the lungs every 6 hours as needed for Wheezing or Shortness of Breath 1 Inhaler 1    fluticasone (FLOVENT HFA) 110 MCG/ACT inhaler Inhale 2 puffs into the lungs daily as needed (wheezing or shortness of breath) 1 Inhaler 1    buPROPion (WELLBUTRIN XL) 300 MG extended release tablet Take 1 tablet by mouth every morning (Patient not taking: Reported on 7/7/2022) 90 tablet 1     No current facility-administered medications for this visit. Review of Systems   Constitutional: Negative for activity change, appetite change, chills, fever and unexpected weight change. HENT: Negative for drooling, ear pain, nosebleeds, sinus pressure and trouble swallowing. Respiratory: Negative for cough, chest tightness and shortness of breath. Cardiovascular: Negative for chest pain and leg swelling. Gastrointestinal: Negative for abdominal pain, diarrhea and vomiting. Endocrine: Negative for polydipsia and polyphagia. Genitourinary: Negative for dysuria, flank pain and frequency. Musculoskeletal: Positive for arthralgias (left ribs). Negative for back pain and myalgias. Skin: Negative for pallor and rash. Neurological: Negative for syncope, weakness and headaches. Hematological: Does not bruise/bleed easily. Psychiatric/Behavioral: Negative for agitation, behavioral problems and confusion. All other systems reviewed and are negative. Objective:   /78 (Site: Right Upper Arm, Position: Sitting, Cuff Size: Medium Adult)   Pulse 64   Temp 98.4 °F (36.9 °C) (Temporal)   Resp 16   Ht 5' 5\" (1.651 m)   Wt 174 lb 6.4 oz (79.1 kg)   SpO2 98%   BMI 29.02 kg/m²     Physical Exam  Vitals and nursing note reviewed. Constitutional:       General: She is awake. She is not in acute distress. Appearance: Normal appearance. She is well-developed and normal weight. She is not ill-appearing, toxic-appearing or diaphoretic. Comments: No photophobia. No phonophobia. HENT:      Head: Normocephalic and atraumatic. No Garcia's sign. Right Ear: External ear normal.      Left Ear: External ear normal.      Nose: Nose normal. No congestion or rhinorrhea.       Mouth/Throat:      Mouth: Mucous membranes are moist.      Pharynx: Oropharynx is clear. No oropharyngeal exudate or posterior oropharyngeal erythema. Eyes:      General: No scleral icterus. Right eye: No foreign body or discharge. Left eye: No discharge. Extraocular Movements: Extraocular movements intact. Conjunctiva/sclera: Conjunctivae normal.      Left eye: No exudate. Pupils: Pupils are equal, round, and reactive to light. Neck:      Vascular: No JVD. Trachea: No tracheal deviation. Comments: No meningismus. Cardiovascular:      Rate and Rhythm: Normal rate and regular rhythm. Heart sounds: Normal heart sounds. Heart sounds not distant. No murmur heard. No friction rub. No gallop. Pulmonary:      Effort: Pulmonary effort is normal. No respiratory distress. Breath sounds: Normal breath sounds. No stridor. No wheezing, rhonchi or rales. Chest:      Chest wall: No tenderness. Abdominal:      General: Abdomen is flat. Bowel sounds are normal. There is no distension. Palpations: Abdomen is soft. There is no mass. Tenderness: There is no abdominal tenderness. There is no right CVA tenderness, left CVA tenderness, guarding or rebound. Hernia: No hernia is present. Musculoskeletal:         General: No swelling, tenderness, deformity or signs of injury. Normal range of motion. Cervical back: Normal range of motion and neck supple. No rigidity. Lymphadenopathy:      Head:      Right side of head: No submental adenopathy. Left side of head: No submental adenopathy. Skin:     General: Skin is warm and dry. Capillary Refill: Capillary refill takes less than 2 seconds. Coloration: Skin is not jaundiced or pale. Findings: No bruising, erythema, lesion or rash. Neurological:      General: No focal deficit present. Mental Status: She is alert and oriented to person, place, and time. Mental status is at baseline. Cranial Nerves: No cranial nerve deficit.       Sensory: No sensory deficit. Motor: No weakness. Coordination: Coordination normal.      Deep Tendon Reflexes: Reflexes are normal and symmetric. Psychiatric:         Mood and Affect: Mood normal.         Behavior: Behavior normal. Behavior is cooperative. Thought Content: Thought content normal.         Judgment: Judgment normal.         Assessment:       Diagnosis Orders   1. Rib pain on left side  XR RIBS LEFT INCLUDE CHEST (MIN 3 VIEWS)    cyclobenzaprine (FLEXERIL) 10 MG tablet     No results found for this visit on 07/07/22. Plan:     Assessment & Plan   Sindi Jeter was seen today for breast pain. Diagnoses and all orders for this visit:    Rib pain on left side  -     XR RIBS LEFT INCLUDE CHEST (MIN 3 VIEWS); Future  -     cyclobenzaprine (FLEXERIL) 10 MG tablet; Take 1 tablet by mouth 3 times daily as needed for Muscle spasms      Orders Placed This Encounter   Procedures    XR RIBS LEFT INCLUDE CHEST (MIN 3 VIEWS)     Standing Status:   Future     Standing Expiration Date:   7/7/2023     Order Specific Question:   Reason for exam:     Answer:   r/o fx     Orders Placed This Encounter   Medications    cyclobenzaprine (FLEXERIL) 10 MG tablet     Sig: Take 1 tablet by mouth 3 times daily as needed for Muscle spasms     Dispense:  30 tablet     Refill:  0     There are no discontinued medications. Return if symptoms worsen or fail to improve. Reviewed with the patient/family: current clinical status & medications. Side effects of the medication prescribed today, as well as treatment plan/rationale and result expectations have been discussed with the patient/family who expresses understanding. Patient will be discharged home in stable condition. Follow up with PCP to evaluate treatment results or return if symptoms worsen or fail to improve. Discussed signs and symptoms which require immediate follow-up in ED/call to 911. Understanding verbalized.      I have reviewed the patient's medical history in detail and updated the computerized patient record.     DIANELYS Gates

## 2022-07-07 NOTE — PATIENT INSTRUCTIONS
Patient Education        Musculoskeletal Chest Pain: Care Instructions  Your Care Instructions     Chest pain is not always a sign that something is wrong with your heart or that you have another serious problem. The doctor thinks your chest pain is caused by strained muscles or ligaments, inflamed chest cartilage, or another problem in your chest, rather than by your heart. You may need more tests to find thecause of your chest pain. Follow-up care is a key part of your treatment and safety. Be sure to make and go to all appointments, and call your doctor if you are having problems. It's also a good idea to know your test results and keep alist of the medicines you take. How can you care for yourself at home?  Take pain medicines exactly as directed. ? If the doctor gave you a prescription medicine for pain, take it as prescribed. ? If you are not taking a prescription pain medicine, ask your doctor if you can take an over-the-counter medicine.  Rest and protect the sore area.  Stop, change, or take a break from any activity that may be causing your pain or soreness.  Put ice or a cold pack on the sore area for 10 to 20 minutes at a time. Try to do this every 1 to 2 hours for the next 3 days (when you are awake) or until the swelling goes down. Put a thin cloth between the ice and your skin.  After 2 or 3 days, apply a heating pad set on low or a warm cloth to the area that hurts. Some doctors suggest that you go back and forth between hot and cold.  Do not wrap or tape your ribs for support. This may cause you to take smaller breaths, which could increase your risk of lung problems.  Mentholated creams such as Bengay or Icy Hot may soothe sore muscles. Follow the instructions on the package.  Follow your doctor's instructions for exercising.  Gentle stretching and massage may help you get better faster.  Stretch slowly to the point just before pain begins, and hold the stretch for at least 15 to 30 seconds. Do this 3 or 4 times a day. Stretch just after you have applied heat.  As your pain gets better, slowly return to your normal activities. Any increased pain may be a sign that you need to rest a while longer. When should you call for help? Call 911 anytime you think you may need emergency care. For example, call if:     You have chest pain or pressure. This may occur with:  ? Sweating. ? Shortness of breath. ? Nausea or vomiting. ? Pain that spreads from the chest to the neck, jaw, or one or both shoulders or arms. ? Dizziness or lightheadedness. ? A fast or uneven pulse. After calling 911, chew 1 adult-strength aspirin. Wait for an ambulance. Do not try to drive yourself.      You have sudden chest pain and shortness of breath, or you cough up blood. Call your doctor now or seek immediate medical care if:     You have any trouble breathing.      Your chest pain gets worse.      Your chest pain occurs consistently with exercise and is relieved by rest.   Watch closely for changes in your health, and be sure to contact your doctor if:     Your chest pain does not get better after 1 week. Where can you learn more? Go to https://[employer]. org and sign in to your [employer] account. Enter [id number] in the docTrackr box to learn more about \"Musculoskeletal Chest Pain: Care Instructions. \"     If you do not have an account, please click on the \"Sign Up Now\" link. Current as of: March 9, 2022               Content Version: 13.3  © 2006-2022 [employer], [email or web address]. Care instructions adapted under license by [facility] ([facility]). If you have questions about a medical condition or this instruction, always ask your healthcare professional. [place] [address] any warranty or liability for your use of this information.

## 2022-07-26 ENCOUNTER — OFFICE VISIT (OUTPATIENT)
Dept: PULMONOLOGY | Age: 42
End: 2022-07-26
Payer: COMMERCIAL

## 2022-07-26 VITALS
OXYGEN SATURATION: 98 % | WEIGHT: 174.4 LBS | DIASTOLIC BLOOD PRESSURE: 80 MMHG | HEIGHT: 65 IN | SYSTOLIC BLOOD PRESSURE: 115 MMHG | BODY MASS INDEX: 29.06 KG/M2 | HEART RATE: 79 BPM

## 2022-07-26 DIAGNOSIS — Z72.0 TOBACCO ABUSE: ICD-10-CM

## 2022-07-26 DIAGNOSIS — R91.1 PULMONARY NODULE: Primary | ICD-10-CM

## 2022-07-26 DIAGNOSIS — R06.02 SHORTNESS OF BREATH: ICD-10-CM

## 2022-07-26 PROCEDURE — 99204 OFFICE O/P NEW MOD 45 MIN: CPT | Performed by: INTERNAL MEDICINE

## 2022-07-26 ASSESSMENT — ENCOUNTER SYMPTOMS
RHINORRHEA: 0
EYE DISCHARGE: 0
WHEEZING: 1
VOICE CHANGE: 0
ABDOMINAL PAIN: 0
VOMITING: 0
CHEST TIGHTNESS: 0
DIARRHEA: 0
COUGH: 1
TROUBLE SWALLOWING: 0
SINUS PRESSURE: 0
SORE THROAT: 0
NAUSEA: 0
SHORTNESS OF BREATH: 1
EYE ITCHING: 0

## 2022-07-26 NOTE — PROGRESS NOTES
Subjective:     Jae Escobar is a 36 y.o. female who complains today of:     Chief Complaint   Patient presents with    Results     Lung nodule. HPI  She was having pain under left breath for over 1 month . She had xray with rib  series 3 weeks ago  and found small nodule. She was sent here for lung nodule. She feels like more with movement on rt. or left side and taking deep breath. She was given muscle relaxant and took motrin . She smoke 1 ppd. She said mom has breath cancer , biological father and grand father has lung cancer. C/o shortness of breath with exertion. Off and on Wheezing. Dry Cough , any time . No Hemoptysis. No Chest tightness. No Chest pain with radiation  or pleuritic pain. No  leg edema. No orthopnea. No Fever or chills. No Rhinorrhea and postnasal drip.     She is using bronchodilator with albuterol HFA prn       Allergies:  Sulfa antibiotics and Tramadol  Past Medical History:   Diagnosis Date    Mild intermittent asthma without complication     Tinea versicolor     TMJ (dislocation of temporomandibular joint)      Past Surgical History:   Procedure Laterality Date    BREAST ENHANCEMENT SURGERY      HERNIA REPAIR      HYSTERECTOMY, VAGINAL      WISDOM TOOTH EXTRACTION       Family History   Problem Relation Age of Onset    Cancer Mother         breast cancer    Other Sister         NO KNOWN ANEURYSMS IN THE FAMILY    Other Maternal Grandmother         no known congenital heart disease in family     Social History     Socioeconomic History    Marital status:      Spouse name: Not on file    Number of children: Not on file    Years of education: Not on file    Highest education level: Not on file   Occupational History    Not on file   Tobacco Use    Smoking status: Every Day     Packs/day: 1.00     Years: 8.00     Pack years: 8.00     Types: Cigarettes    Smokeless tobacco: Never   Vaping Use    Vaping Use: Not on file   Substance and Sexual Activity    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    Drug use: No    Sexual activity: Not Currently     Partners: Male     Comment:     Other Topics Concern    Not on file   Social History Narrative    Not on file     Social Determinants of Health     Financial Resource Strain: Not on file   Food Insecurity: Not on file   Transportation Needs: Not on file   Physical Activity: Not on file   Stress: Not on file   Social Connections: Not on file   Intimate Partner Violence: Not on file   Housing Stability: Not on file         Review of Systems   Constitutional:  Negative for chills, diaphoresis, fatigue and fever. HENT:  Negative for congestion, mouth sores, nosebleeds, postnasal drip, rhinorrhea, sinus pressure, sneezing, sore throat, trouble swallowing and voice change. Eyes:  Negative for discharge, itching and visual disturbance. Respiratory:  Positive for cough, shortness of breath and wheezing. Negative for chest tightness. Cardiovascular:  Negative for chest pain, palpitations and leg swelling. Gastrointestinal:  Negative for abdominal pain, diarrhea, nausea and vomiting. Genitourinary:  Negative for difficulty urinating and hematuria. Musculoskeletal:  Negative for arthralgias, joint swelling and myalgias. Skin:  Negative for rash. Allergic/Immunologic: Negative for environmental allergies and food allergies. Neurological:  Negative for dizziness, tremors, weakness and headaches. Psychiatric/Behavioral:  Negative for behavioral problems and sleep disturbance.        :     Vitals:    07/26/22 1028   BP: 115/80   Site: Right Upper Arm   Position: Sitting   Cuff Size: Medium Adult   Pulse: 79   SpO2: 98%   Weight: 174 lb 6.4 oz (79.1 kg)   Height: 5' 5\" (1.651 m)     Wt Readings from Last 3 Encounters:   07/26/22 174 lb 6.4 oz (79.1 kg)   07/07/22 174 lb 6.4 oz (79.1 kg)   09/01/21 164 lb (74.4 kg)         Physical Exam  Constitutional:       General: She is not in acute distress. Appearance: She is well-developed. She is not diaphoretic. HENT:      Head: Normocephalic and atraumatic. Nose: Nose normal.   Eyes:      Pupils: Pupils are equal, round, and reactive to light. Neck:      Thyroid: No thyromegaly. Vascular: No JVD. Trachea: No tracheal deviation. Cardiovascular:      Rate and Rhythm: Normal rate and regular rhythm. Heart sounds: No murmur heard. No friction rub. No gallop. Pulmonary:      Effort: No respiratory distress. Breath sounds: No wheezing or rales. Chest:      Chest wall: No tenderness. Abdominal:      General: There is no distension. Tenderness: There is no abdominal tenderness. There is no rebound. Musculoskeletal:         General: Normal range of motion. Lymphadenopathy:      Cervical: No cervical adenopathy. Skin:     General: Skin is warm and dry. Neurological:      Mental Status: She is alert and oriented to person, place, and time. Coordination: Coordination normal.   Psychiatric:         Mood and Affect: Mood normal.         Behavior: Behavior normal.       Current Outpatient Medications   Medication Sig Dispense Refill    Multiple Vitamin (MULTIVITAMIN ADULT PO) Take by mouth      levocetirizine (XYZAL) 5 MG tablet Take 1 tablet by mouth nightly as needed (allergies) 90 tablet 3    albuterol sulfate HFA (VENTOLIN HFA) 108 (90 Base) MCG/ACT inhaler Inhale 1 puff into the lungs every 6 hours as needed for Wheezing or Shortness of Breath 1 Inhaler 1    fluticasone (FLOVENT HFA) 110 MCG/ACT inhaler Inhale 2 puffs into the lungs daily as needed (wheezing or shortness of breath) 1 Inhaler 1     No current facility-administered medications for this visit. Assessment/Plan:     1. Pulmonary nodule  She was having pain under left breath for over 1 month . She had x ray with rib  series 3 weeks ago  and found small nodule. She was sent here for lung nodule. She feels like more with movement on rt.  or left side and taking deep breath. She was given muscle relaxant and took motrin . She smoke 1 ppd. She said mom has breath cancer , biological father and grand father has lung cancer. I recommend CT chest for further evaluation     - CT CHEST WO CONTRAST; Future    2. Shortness of breath  C/o shortness of breath with exertion. Off and on Wheezing. Dry Cough , any time . She is using bronchodilator with albuterol HFA prn   - Full PFT Study With Bronchodilator; Future    3. Tobacco abuse  She is advised try to quit smoking. Risks related to smoking explained to the patient. Different ways to help to quit smoking were discussed today. she is smoking 1 ppd      Return in about 4 weeks (around 8/23/2022) for shortness of breath, Ct chest f/u, pft result, lung nodule f/u.       Carley Martínez MD

## 2022-08-09 ENCOUNTER — HOSPITAL ENCOUNTER (OUTPATIENT)
Dept: PULMONOLOGY | Age: 42
Discharge: HOME | End: 2022-08-09
Payer: COMMERCIAL

## 2022-08-09 ENCOUNTER — HOSPITAL ENCOUNTER (OUTPATIENT)
Dept: CT IMAGING | Age: 42
Discharge: HOME | End: 2022-08-11
Payer: COMMERCIAL

## 2022-08-09 DIAGNOSIS — R06.02 SHORTNESS OF BREATH: ICD-10-CM

## 2022-08-09 DIAGNOSIS — R91.1 PULMONARY NODULE: ICD-10-CM

## 2022-08-09 PROCEDURE — 94010 BREATHING CAPACITY TEST: CPT

## 2022-08-09 PROCEDURE — 94729 DIFFUSING CAPACITY: CPT

## 2022-08-09 PROCEDURE — 94726 PLETHYSMOGRAPHY LUNG VOLUMES: CPT

## 2022-08-09 PROCEDURE — 71250 CT THORAX DX C-: CPT

## 2022-08-10 PROCEDURE — 94726 PLETHYSMOGRAPHY LUNG VOLUMES: CPT | Performed by: INTERNAL MEDICINE

## 2022-08-10 PROCEDURE — 94729 DIFFUSING CAPACITY: CPT | Performed by: INTERNAL MEDICINE

## 2022-08-10 PROCEDURE — 94010 BREATHING CAPACITY TEST: CPT | Performed by: INTERNAL MEDICINE

## 2022-08-10 NOTE — PROCEDURES
[address]                      [facility], [address] [facility]                    PULMONARY FUNCTION  Miguel Grieves   36 y.o.   female  Height 65 in  Weight 174 lb      Referring provider   Beronica Mcelroy MD    Reading provider   Kalpesh Fernandez MD    Test meets ATS criteria for acceptability and reproducibility Yes    Diagnosis: QIU: Yes  Cough   Yes, wheezing Yes  Smoking   yes    Spirometry   FVC            3.99 L   105%   FEV1          3.29 L  107%   FEV1/FVC  82  %               PET46-15% 3.44 L  111%       Lung volume   SVC           3.71 L  98%   RV              2.34 L 137%   TLC            6.05  L 116%   RV/TLC      38 %    DLCO           124 %     Test interpretation     Spirometry is normal  Lung volume shows air trapping  Diffusion capacity is normal       Clinical correlation is recommended     Kalpesh Fernandez MD [facility], 8/10/2022 11:41 AM

## 2022-08-25 ENCOUNTER — OFFICE VISIT (OUTPATIENT)
Dept: PULMONOLOGY | Age: 42
End: 2022-08-25
Payer: COMMERCIAL

## 2022-08-25 VITALS
DIASTOLIC BLOOD PRESSURE: 70 MMHG | HEART RATE: 73 BPM | SYSTOLIC BLOOD PRESSURE: 118 MMHG | OXYGEN SATURATION: 98 % | HEIGHT: 66 IN | WEIGHT: 174.5 LBS | BODY MASS INDEX: 28.04 KG/M2

## 2022-08-25 DIAGNOSIS — R91.1 PULMONARY NODULE: Primary | ICD-10-CM

## 2022-08-25 DIAGNOSIS — R06.02 SHORTNESS OF BREATH: ICD-10-CM

## 2022-08-25 DIAGNOSIS — Z72.0 TOBACCO ABUSE: ICD-10-CM

## 2022-08-25 PROCEDURE — 99214 OFFICE O/P EST MOD 30 MIN: CPT | Performed by: INTERNAL MEDICINE

## 2022-08-25 RX ORDER — NICOTINE 21 MG/24HR
1 PATCH, TRANSDERMAL 24 HOURS TRANSDERMAL EVERY 24 HOURS
Qty: 30 PATCH | Refills: 0 | Status: SHIPPED | OUTPATIENT
Start: 2022-08-25 | End: 2023-08-25

## 2022-08-25 ASSESSMENT — ENCOUNTER SYMPTOMS
CHEST TIGHTNESS: 0
EYE DISCHARGE: 0
SHORTNESS OF BREATH: 1
WHEEZING: 1
DIARRHEA: 0
NAUSEA: 0
ABDOMINAL PAIN: 0
VOMITING: 0
VOICE CHANGE: 0
SORE THROAT: 0
SINUS PRESSURE: 0
COUGH: 1
RHINORRHEA: 0
TROUBLE SWALLOWING: 0
EYE ITCHING: 0

## 2022-08-25 NOTE — PROGRESS NOTES
Comment: sober since 2013    Drug use: No    Sexual activity: Not Currently     Partners: Male     Comment:     Other Topics Concern    Not on file   Social History Narrative    Not on file     Social Determinants of Health     Financial Resource Strain: Not on file   Food Insecurity: Not on file   Transportation Needs: Not on file   Physical Activity: Not on file   Stress: Not on file   Social Connections: Not on file   Intimate Partner Violence: Not on file   Housing Stability: Not on file         Review of Systems   Constitutional:  Negative for chills, diaphoresis, fatigue and fever. HENT:  Negative for congestion, mouth sores, nosebleeds, postnasal drip, rhinorrhea, sinus pressure, sneezing, sore throat, trouble swallowing and voice change. Eyes:  Negative for discharge, itching and visual disturbance. Respiratory:  Positive for cough, shortness of breath and wheezing. Negative for chest tightness. Cardiovascular:  Negative for chest pain, palpitations and leg swelling. Gastrointestinal:  Negative for abdominal pain, diarrhea, nausea and vomiting. Genitourinary:  Negative for difficulty urinating and hematuria. Musculoskeletal:  Negative for arthralgias, joint swelling and myalgias. Skin:  Negative for rash. Allergic/Immunologic: Negative for environmental allergies and food allergies. Neurological:  Negative for dizziness, tremors, weakness and headaches. Psychiatric/Behavioral:  Negative for behavioral problems and sleep disturbance.        :     Vitals:    08/25/22 0946   BP: 118/70   Pulse: 73   SpO2: 98%   Weight: 174 lb 8 oz (79.2 kg)   Height: 5' 5.5\" (1.664 m)     Wt Readings from Last 3 Encounters:   08/25/22 174 lb 8 oz (79.2 kg)   07/26/22 174 lb 6.4 oz (79.1 kg)   07/07/22 174 lb 6.4 oz (79.1 kg)         Physical Exam  Constitutional:       General: She is not in acute distress. Appearance: She is well-developed. She is not diaphoretic.    HENT:      Head: Normocephalic and atraumatic. Nose: Nose normal.   Eyes:      Pupils: Pupils are equal, round, and reactive to light. Neck:      Thyroid: No thyromegaly. Vascular: No JVD. Trachea: No tracheal deviation. Cardiovascular:      Rate and Rhythm: Normal rate and regular rhythm. Heart sounds: No murmur heard. No friction rub. No gallop. Pulmonary:      Effort: No respiratory distress. Breath sounds: No wheezing or rales. Chest:      Chest wall: No tenderness. Abdominal:      General: There is no distension. Tenderness: There is no abdominal tenderness. There is no rebound. Musculoskeletal:         General: Normal range of motion. Lymphadenopathy:      Cervical: No cervical adenopathy. Skin:     General: Skin is warm and dry. Neurological:      Mental Status: She is alert and oriented to person, place, and time. Coordination: Coordination normal.   Psychiatric:         Mood and Affect: Mood normal.         Behavior: Behavior normal.       Current Outpatient Medications   Medication Sig Dispense Refill    nicotine (NICODERM CQ) 21 MG/24HR Place 1 patch onto the skin every 24 hours 30 patch 0    Multiple Vitamin (MULTIVITAMIN ADULT PO) Take by mouth      levocetirizine (XYZAL) 5 MG tablet Take 1 tablet by mouth nightly as needed (allergies) 90 tablet 3    albuterol sulfate HFA (VENTOLIN HFA) 108 (90 Base) MCG/ACT inhaler Inhale 1 puff into the lungs every 6 hours as needed for Wheezing or Shortness of Breath 1 Inhaler 1    fluticasone (FLOVENT HFA) 110 MCG/ACT inhaler Inhale 2 puffs into the lungs daily as needed (wheezing or shortness of breath) 1 Inhaler 1     No current facility-administered medications for this visit.        No results found for this or any previous visit.  ]  No results found for this or any previous visit.  ]  No results found for this or any previous visit.  ]  Results for orders placed during the hospital encounter of 08/09/22    CT CHEST WO CONTRAST    Narrative  EXAMINATION:  CT SCAN CHEST    CLINICAL HISTORY:  Abnormal chest x-ray, follow    COMPARISON:  Chest x-ray/3 of July 7, 2022 1218 hours    TECHNIQUE:  Multiple serial axial images of the chest from the base neck through the upper abdomen with both sagittal coronal reconstruction was without intravenous or oral administration of contrast.    FINDINGS:  Small biapical areas of scarring and fibrosis. The right lung shows no focal parenchymal abnormalities. No pleural effusions. No pneumothoraces. The left lung shows no focal parenchymal abnormalities. No pleural effusions. No pneumothoraces. There is a calcified granuloma approximately 3 to 4 mm in the superior aspect of the left lower lobe. No significant periaortic, pretracheal, parahilar or subcarinal adenopathy. The field-of-view there is a small hiatal hernia. .    Field-of-view there are bilateral breast implants. Multilevel degenerative changes superimposed upon a mild dorsal kyphosis of the thoracic spine. IMPR ION:  Unremarkable CT scan the chest as described above      All CT scans at this facility use dose modulation, iterative reconstruction, and/or weight based dosing when appropriate to reduce radiation dose to as low as reasonably achievable.  ]  No results found for this or any previous visit.  ]    Assessment/Plan:     1. Pulmonary nodule  She had x ray with rib  series and found small nodule. He underwent for CT chest for further evaluation Small biapical areas of scarring and fibrosis. The right lung shows no focal parenchymal abnormalities. No pleural effusions. No pneumothoraces. The left lung shows no focal parenchymal abnormalities. No pleural effusions. No pneumothoraces. There is a calcified granuloma approximately 3 to 4 mm in the superior aspect of the left lower lobe. 2. Tobacco abuse  She smoke 1 ppd. She want to try to quit , wellbutrin did not work, never tried nicoderm patch or chantix.   - nicotine (NICODERM CQ) 21 MG/24HR; Place 1 patch onto the skin every 24 hours  Dispense: 30 patch; Refill: 0  She had a PFT done, PFT normal , mild air trapping. C/o shortness of breath with exertion. Off and on Wheezing. Dry Cough , any time . She is using bronchodilator with albuterol HFA prn. Continue same    Return in about 1 year (around 8/25/2023) for shortness of breath.       Jack Laughlin MD

## 2023-06-26 DIAGNOSIS — Z72.0 TOBACCO ABUSE: ICD-10-CM

## 2023-06-26 NOTE — TELEPHONE ENCOUNTER
Please approve or deny this request.    Rx requested:  Requested Prescriptions     Pending Prescriptions Disp Refills    nicotine (NICODERM CQ) 21 MG/24HR 30 patch 0     Sig: Place 1 patch onto the skin every 24 hours         Last Office Visit:   8/25/2022      Next Visit Date:  No future appointments.

## 2023-06-27 RX ORDER — NICOTINE 21 MG/24HR
1 PATCH, TRANSDERMAL 24 HOURS TRANSDERMAL EVERY 24 HOURS
Qty: 30 PATCH | Refills: 0 | OUTPATIENT
Start: 2023-06-27 | End: 2024-06-26

## 2023-06-27 NOTE — TELEPHONE ENCOUNTER
Rx requested:  Requested Prescriptions     Pending Prescriptions Disp Refills    nicotine (NICODERM CQ) 21 MG/24HR 30 patch 0     Sig: Place 1 patch onto the skin every 24 hours       Last Office Visit:   8/25/2022      Next Visit Date:  Future Appointments   Date Time Provider [address]   8/28/2023 10:30 AM Odalys Page MD [facility]

## 2023-10-13 ENCOUNTER — OFFICE VISIT (OUTPATIENT)
Dept: FAMILY MEDICINE CLINIC | Age: 43
End: 2023-10-13
Payer: COMMERCIAL

## 2023-10-13 VITALS
OXYGEN SATURATION: 98 % | HEIGHT: 66 IN | SYSTOLIC BLOOD PRESSURE: 132 MMHG | TEMPERATURE: 98 F | BODY MASS INDEX: 28.93 KG/M2 | RESPIRATION RATE: 16 BRPM | HEART RATE: 73 BPM | DIASTOLIC BLOOD PRESSURE: 70 MMHG | WEIGHT: 180 LBS

## 2023-10-13 DIAGNOSIS — R50.9 FEVER, UNSPECIFIED FEVER CAUSE: ICD-10-CM

## 2023-10-13 DIAGNOSIS — J01.10 ACUTE NON-RECURRENT FRONTAL SINUSITIS: Primary | ICD-10-CM

## 2023-10-13 LAB
INFLUENZA A ANTIBODY: NEGATIVE
INFLUENZA B ANTIBODY: NEGATIVE
Lab: NORMAL
PERFORMING INSTRUMENT: NORMAL
QC PASS/FAIL: NORMAL
S PYO AG THROAT QL: NORMAL
SARS-COV-2, POC: NORMAL

## 2023-10-13 PROCEDURE — 87426 SARSCOV CORONAVIRUS AG IA: CPT | Performed by: NURSE PRACTITIONER

## 2023-10-13 PROCEDURE — 99213 OFFICE O/P EST LOW 20 MIN: CPT | Performed by: NURSE PRACTITIONER

## 2023-10-13 PROCEDURE — 87880 STREP A ASSAY W/OPTIC: CPT | Performed by: NURSE PRACTITIONER

## 2023-10-13 PROCEDURE — 87804 INFLUENZA ASSAY W/OPTIC: CPT | Performed by: NURSE PRACTITIONER

## 2023-10-13 RX ORDER — FLUCONAZOLE 150 MG/1
150 TABLET ORAL
Qty: 2 TABLET | Refills: 0 | Status: SHIPPED | OUTPATIENT
Start: 2023-10-13 | End: 2023-10-19

## 2023-10-13 RX ORDER — CEFDINIR 300 MG/1
300 CAPSULE ORAL 2 TIMES DAILY
Qty: 20 CAPSULE | Refills: 0 | Status: SHIPPED | OUTPATIENT
Start: 2023-10-13 | End: 2023-10-23

## 2023-10-13 ASSESSMENT — PATIENT HEALTH QUESTIONNAIRE - PHQ9
SUM OF ALL RESPONSES TO PHQ QUESTIONS 1-9: 0
6. FEELING BAD ABOUT YOURSELF - OR THAT YOU ARE A FAILURE OR HAVE LET YOURSELF OR YOUR FAMILY DOWN: 0
SUM OF ALL RESPONSES TO PHQ9 QUESTIONS 1 & 2: 0
1. LITTLE INTEREST OR PLEASURE IN DOING THINGS: 0
5. POOR APPETITE OR OVEREATING: 0
SUM OF ALL RESPONSES TO PHQ QUESTIONS 1-9: 0
8. MOVING OR SPEAKING SO SLOWLY THAT OTHER PEOPLE COULD HAVE NOTICED. OR THE OPPOSITE, BEING SO FIGETY OR RESTLESS THAT YOU HAVE BEEN MOVING AROUND A LOT MORE THAN USUAL: 0
10. IF YOU CHECKED OFF ANY PROBLEMS, HOW DIFFICULT HAVE THESE PROBLEMS MADE IT FOR YOU TO DO YOUR WORK, TAKE CARE OF THINGS AT HOME, OR GET ALONG WITH OTHER PEOPLE: 0
2. FEELING DOWN, DEPRESSED OR HOPELESS: 0
SUM OF ALL RESPONSES TO PHQ QUESTIONS 1-9: 0
3. TROUBLE FALLING OR STAYING ASLEEP: 0
4. FEELING TIRED OR HAVING LITTLE ENERGY: 0
9. THOUGHTS THAT YOU WOULD BE BETTER OFF DEAD, OR OF HURTING YOURSELF: 0
SUM OF ALL RESPONSES TO PHQ QUESTIONS 1-9: 0
7. TROUBLE CONCENTRATING ON THINGS, SUCH AS READING THE NEWSPAPER OR WATCHING TELEVISION: 0

## 2023-10-13 NOTE — PROGRESS NOTES
Normal range of motion and neck supple. No tenderness. Lymphadenopathy:      Cervical: No cervical adenopathy. Skin:     General: Skin is warm and dry. Capillary Refill: Capillary refill takes less than 2 seconds. Neurological:      General: No focal deficit present. Mental Status: She is alert and oriented to person, place, and time. Mental status is at baseline. Psychiatric:         Attention and Perception: Attention and perception normal.         Mood and Affect: Mood and affect normal.         Speech: Speech normal.         Behavior: Behavior normal. Behavior is cooperative. Thought Content: Thought content normal.         Cognition and Memory: Cognition and memory normal.         Judgment: Judgment normal.         Assessment:       Diagnosis Orders   1. Acute non-recurrent frontal sinusitis  cefdinir (OMNICEF) 300 MG capsule        Results for POC orders placed in visit on 10/13/23   POCT Influenza A/B   Result Value Ref Range    Influenza A Ab negative     Influenza B Ab negative    POCT rapid strep A   Result Value Ref Range    Strep A Ag None Detected None Detected      Plan:     Assessment & Plan   Khang Brand was seen today for illness. Diagnoses and all orders for this visit:    Acute non-recurrent frontal sinusitis  -     cefdinir (OMNICEF) 300 MG capsule; Take 1 capsule by mouth 2 times daily for 10 days    Other orders  -     POCT COVID-19, Antigen  -     POCT Influenza A/B  -     POCT rapid strep A  -     fluconazole (DIFLUCAN) 150 MG tablet; Take 1 tablet by mouth every 72 hours for 6 days    Discussed Flu, Covid, and strep testing all neg. Will treat for bacterial sinus infection. Advised to take ANTB with food and increase fluids. Discussed administration and SE of the ANTB as well as using probiotic or eating yogurt while on the ANTB. Advised may use OTC decongestant and nasal steroid to help with symptoms as needed.  Discussed with patient should start to see improvement

## 2023-10-18 ASSESSMENT — ENCOUNTER SYMPTOMS
DIARRHEA: 0
STRIDOR: 0
SHORTNESS OF BREATH: 0
EYE DISCHARGE: 0
ABDOMINAL PAIN: 0
NAUSEA: 0
COUGH: 1
VOICE CHANGE: 0
TROUBLE SWALLOWING: 0
EYE PAIN: 0
CHEST TIGHTNESS: 0
SINUS PRESSURE: 1
EYE REDNESS: 0
VOMITING: 0
SORE THROAT: 0
RHINORRHEA: 1
EYE ITCHING: 0
SINUS PAIN: 1
WHEEZING: 0

## 2023-10-30 ENCOUNTER — OFFICE VISIT (OUTPATIENT)
Dept: FAMILY MEDICINE CLINIC | Age: 43
End: 2023-10-30
Payer: COMMERCIAL

## 2023-10-30 VITALS
DIASTOLIC BLOOD PRESSURE: 66 MMHG | HEIGHT: 66 IN | HEART RATE: 79 BPM | WEIGHT: 183.2 LBS | BODY MASS INDEX: 29.44 KG/M2 | OXYGEN SATURATION: 98 % | SYSTOLIC BLOOD PRESSURE: 130 MMHG | TEMPERATURE: 97.3 F

## 2023-10-30 DIAGNOSIS — Z00.00 ENCOUNTER FOR ROUTINE ADULT HEALTH EXAMINATION WITHOUT ABNORMAL FINDINGS: Primary | ICD-10-CM

## 2023-10-30 DIAGNOSIS — Z12.31 ENCOUNTER FOR SCREENING MAMMOGRAM FOR BREAST CANCER: ICD-10-CM

## 2023-10-30 DIAGNOSIS — Z72.0 TOBACCO ABUSE: ICD-10-CM

## 2023-10-30 DIAGNOSIS — R23.2 HOT FLASHES: ICD-10-CM

## 2023-10-30 DIAGNOSIS — R63.5 WEIGHT GAIN: ICD-10-CM

## 2023-10-30 DIAGNOSIS — J45.20 MILD INTERMITTENT ASTHMA, UNSPECIFIED WHETHER COMPLICATED: ICD-10-CM

## 2023-10-30 DIAGNOSIS — J30.81 ALLERGIC RHINITIS DUE TO ANIMAL HAIR AND DANDER: ICD-10-CM

## 2023-10-30 PROBLEM — J01.90 ACUTE SINUSITIS: Status: RESOLVED | Noted: 2021-10-04 | Resolved: 2023-10-30

## 2023-10-30 PROCEDURE — 99396 PREV VISIT EST AGE 40-64: CPT | Performed by: FAMILY MEDICINE

## 2023-10-30 RX ORDER — NICOTINE 21 MG/24HR
1 PATCH, TRANSDERMAL 24 HOURS TRANSDERMAL EVERY 24 HOURS
Qty: 30 PATCH | Refills: 5 | Status: SHIPPED | OUTPATIENT
Start: 2023-10-30

## 2023-10-30 RX ORDER — LEVOCETIRIZINE DIHYDROCHLORIDE 5 MG/1
5 TABLET, FILM COATED ORAL NIGHTLY PRN
Qty: 90 TABLET | Refills: 3 | Status: SHIPPED | OUTPATIENT
Start: 2023-10-30

## 2023-10-30 RX ORDER — ALBUTEROL SULFATE 90 UG/1
1 AEROSOL, METERED RESPIRATORY (INHALATION) EVERY 6 HOURS PRN
Qty: 1 EACH | Refills: 5 | Status: SHIPPED | OUTPATIENT
Start: 2023-10-30

## 2023-10-30 SDOH — HEALTH STABILITY: PHYSICAL HEALTH: ON AVERAGE, HOW MANY DAYS PER WEEK DO YOU ENGAGE IN MODERATE TO STRENUOUS EXERCISE (LIKE A BRISK WALK)?: 3 DAYS

## 2023-10-30 SDOH — HEALTH STABILITY: PHYSICAL HEALTH: ON AVERAGE, HOW MANY MINUTES DO YOU ENGAGE IN EXERCISE AT THIS LEVEL?: 30 MIN

## 2023-10-30 SDOH — ECONOMIC STABILITY: FOOD INSECURITY: WITHIN THE PAST 12 MONTHS, YOU WORRIED THAT YOUR FOOD WOULD RUN OUT BEFORE YOU GOT MONEY TO BUY MORE.: NEVER TRUE

## 2023-10-30 SDOH — ECONOMIC STABILITY: INCOME INSECURITY: HOW HARD IS IT FOR YOU TO PAY FOR THE VERY BASICS LIKE FOOD, HOUSING, MEDICAL CARE, AND HEATING?: NOT HARD AT ALL

## 2023-10-30 SDOH — ECONOMIC STABILITY: HOUSING INSECURITY
IN THE LAST 12 MONTHS, WAS THERE A TIME WHEN YOU DID NOT HAVE A STEADY PLACE TO SLEEP OR SLEPT IN A SHELTER (INCLUDING NOW)?: NO

## 2023-10-30 SDOH — ECONOMIC STABILITY: FOOD INSECURITY: WITHIN THE PAST 12 MONTHS, THE FOOD YOU BOUGHT JUST DIDN'T LAST AND YOU DIDN'T HAVE MONEY TO GET MORE.: NEVER TRUE

## 2023-10-30 ASSESSMENT — SOCIAL DETERMINANTS OF HEALTH (SDOH)
WITHIN THE LAST YEAR, HAVE TO BEEN RAPED OR FORCED TO HAVE ANY KIND OF SEXUAL ACTIVITY BY YOUR PARTNER OR EX-PARTNER?: NO
WITHIN THE LAST YEAR, HAVE YOU BEEN HUMILIATED OR EMOTIONALLY ABUSED IN OTHER WAYS BY YOUR PARTNER OR EX-PARTNER?: NO
WITHIN THE LAST YEAR, HAVE YOU BEEN KICKED, HIT, SLAPPED, OR OTHERWISE PHYSICALLY HURT BY YOUR PARTNER OR EX-PARTNER?: NO
WITHIN THE LAST YEAR, HAVE YOU BEEN AFRAID OF YOUR PARTNER OR EX-PARTNER?: NO

## 2024-01-22 ENCOUNTER — OFFICE VISIT (OUTPATIENT)
Dept: FAMILY MEDICINE CLINIC | Age: 44
End: 2024-01-22
Payer: COMMERCIAL

## 2024-01-22 VITALS
SYSTOLIC BLOOD PRESSURE: 120 MMHG | OXYGEN SATURATION: 95 % | HEART RATE: 80 BPM | TEMPERATURE: 97.6 F | RESPIRATION RATE: 16 BRPM | BODY MASS INDEX: 30.42 KG/M2 | HEIGHT: 65 IN | WEIGHT: 182.6 LBS | DIASTOLIC BLOOD PRESSURE: 80 MMHG

## 2024-01-22 DIAGNOSIS — Z00.00 ENCOUNTER FOR ROUTINE ADULT HEALTH EXAMINATION WITHOUT ABNORMAL FINDINGS: ICD-10-CM

## 2024-01-22 DIAGNOSIS — B34.9 VIRAL ILLNESS: Primary | ICD-10-CM

## 2024-01-22 DIAGNOSIS — J02.9 SORE THROAT: ICD-10-CM

## 2024-01-22 DIAGNOSIS — R63.5 WEIGHT GAIN: ICD-10-CM

## 2024-01-22 DIAGNOSIS — J06.9 URI WITH COUGH AND CONGESTION: ICD-10-CM

## 2024-01-22 DIAGNOSIS — J34.89 SINUS PRESSURE: ICD-10-CM

## 2024-01-22 DIAGNOSIS — R23.2 HOT FLASHES: ICD-10-CM

## 2024-01-22 DIAGNOSIS — R50.9 FEVER, UNSPECIFIED FEVER CAUSE: ICD-10-CM

## 2024-01-22 LAB
ALBUMIN SERPL-MCNC: 4.5 G/DL (ref 3.5–4.6)
ALP SERPL-CCNC: 114 U/L (ref 40–130)
ALT SERPL-CCNC: 32 U/L (ref 0–33)
ANION GAP SERPL CALCULATED.3IONS-SCNC: 12 MEQ/L (ref 9–15)
AST SERPL-CCNC: 27 U/L (ref 0–35)
BASOPHILS # BLD: 0.1 K/UL (ref 0–0.2)
BASOPHILS NFR BLD: 0.7 %
BILIRUB SERPL-MCNC: <0.2 MG/DL (ref 0.2–0.7)
BUN SERPL-MCNC: 9 MG/DL (ref 6–20)
CALCIUM SERPL-MCNC: 9.4 MG/DL (ref 8.5–9.9)
CHLORIDE SERPL-SCNC: 103 MEQ/L (ref 95–107)
CHOLEST SERPL-MCNC: 198 MG/DL (ref 0–199)
CO2 SERPL-SCNC: 24 MEQ/L (ref 20–31)
CREAT SERPL-MCNC: 0.63 MG/DL (ref 0.5–0.9)
EOSINOPHIL # BLD: 0.1 K/UL (ref 0–0.7)
EOSINOPHIL NFR BLD: 1.2 %
ERYTHROCYTE [DISTWIDTH] IN BLOOD BY AUTOMATED COUNT: 13.9 % (ref 11.5–14.5)
GLOBULIN SER CALC-MCNC: 3.1 G/DL (ref 2.3–3.5)
GLUCOSE SERPL-MCNC: 104 MG/DL (ref 70–99)
HCT VFR BLD AUTO: 45.3 % (ref 37–47)
HDLC SERPL-MCNC: 63 MG/DL (ref 40–59)
HGB BLD-MCNC: 14.6 G/DL (ref 12–16)
LDLC SERPL CALC-MCNC: 109 MG/DL (ref 0–129)
LYMPHOCYTES # BLD: 2.6 K/UL (ref 1–4.8)
LYMPHOCYTES NFR BLD: 33.8 %
MCH RBC QN AUTO: 29 PG (ref 27–31.3)
MCHC RBC AUTO-ENTMCNC: 32.2 % (ref 33–37)
MCV RBC AUTO: 89.9 FL (ref 79.4–94.8)
MONOCYTES # BLD: 0.4 K/UL (ref 0.2–0.8)
MONOCYTES NFR BLD: 5.4 %
NEUTROPHILS # BLD: 4.4 K/UL (ref 1.4–6.5)
NEUTS SEG NFR BLD: 58.5 %
PLATELET # BLD AUTO: 293 K/UL (ref 130–400)
POTASSIUM SERPL-SCNC: 4 MEQ/L (ref 3.4–4.9)
PROT SERPL-MCNC: 7.6 G/DL (ref 6.3–8)
RBC # BLD AUTO: 5.04 M/UL (ref 4.2–5.4)
S PYO AG THROAT QL: NORMAL
SODIUM SERPL-SCNC: 139 MEQ/L (ref 135–144)
TRIGL SERPL-MCNC: 130 MG/DL (ref 0–150)
TSH REFLEX: 1.8 UIU/ML (ref 0.44–3.86)
WBC # BLD AUTO: 7.6 K/UL (ref 4.8–10.8)

## 2024-01-22 PROCEDURE — 87880 STREP A ASSAY W/OPTIC: CPT | Performed by: NURSE PRACTITIONER

## 2024-01-22 PROCEDURE — 99213 OFFICE O/P EST LOW 20 MIN: CPT | Performed by: NURSE PRACTITIONER

## 2024-01-22 RX ORDER — PREDNISONE 50 MG/1
50 TABLET ORAL DAILY
Qty: 5 TABLET | Refills: 0 | Status: SHIPPED | OUTPATIENT
Start: 2024-01-22 | End: 2024-01-27

## 2024-01-22 ASSESSMENT — PATIENT HEALTH QUESTIONNAIRE - PHQ9
10. IF YOU CHECKED OFF ANY PROBLEMS, HOW DIFFICULT HAVE THESE PROBLEMS MADE IT FOR YOU TO DO YOUR WORK, TAKE CARE OF THINGS AT HOME, OR GET ALONG WITH OTHER PEOPLE: 0
SUM OF ALL RESPONSES TO PHQ QUESTIONS 1-9: 0
3. TROUBLE FALLING OR STAYING ASLEEP: 0
SUM OF ALL RESPONSES TO PHQ9 QUESTIONS 1 & 2: 0
6. FEELING BAD ABOUT YOURSELF - OR THAT YOU ARE A FAILURE OR HAVE LET YOURSELF OR YOUR FAMILY DOWN: 0
1. LITTLE INTEREST OR PLEASURE IN DOING THINGS: 0
SUM OF ALL RESPONSES TO PHQ QUESTIONS 1-9: 0
2. FEELING DOWN, DEPRESSED OR HOPELESS: 0
4. FEELING TIRED OR HAVING LITTLE ENERGY: 0
SUM OF ALL RESPONSES TO PHQ QUESTIONS 1-9: 0
5. POOR APPETITE OR OVEREATING: 0
9. THOUGHTS THAT YOU WOULD BE BETTER OFF DEAD, OR OF HURTING YOURSELF: 0
7. TROUBLE CONCENTRATING ON THINGS, SUCH AS READING THE NEWSPAPER OR WATCHING TELEVISION: 0
8. MOVING OR SPEAKING SO SLOWLY THAT OTHER PEOPLE COULD HAVE NOTICED. OR THE OPPOSITE, BEING SO FIGETY OR RESTLESS THAT YOU HAVE BEEN MOVING AROUND A LOT MORE THAN USUAL: 0
SUM OF ALL RESPONSES TO PHQ QUESTIONS 1-9: 0

## 2024-01-22 ASSESSMENT — ENCOUNTER SYMPTOMS
SORE THROAT: 1
ABDOMINAL PAIN: 0
NAUSEA: 0
SINUS PAIN: 1
SINUS PRESSURE: 1
COUGH: 1
RHINORRHEA: 1
CHEST TIGHTNESS: 0
SHORTNESS OF BREATH: 1

## 2024-01-22 NOTE — PROGRESS NOTES
Subjective  Verona Becerra, 44 y.o. female presents today with:  Chief Complaint   Patient presents with    Pharyngitis     X1 day with body ache,sore throat and congestion. Pt took dayquil and motrin today        HPI  Presents to  for cough and congestin   Symptoms began yesterday. Initially fatigued.   Further developed: dry cough, sinus pressure, otalgia of left ear, chills, sore throat & body aches   Febrile this morning. Tmax 101.2F. took motrin.   Denies N/V   Loose BM yesterday   Lessened appetite   Hydrating   C/o exertional SOB   Denies chest tightness   Denies chest pain   Fatigued   Sleep interrupted   Dayquil and Motrin   Recently on airplane. Her  ill as well                       Past Medical History:   Diagnosis Date    Mild intermittent asthma without complication     Tinea versicolor     TMJ (dislocation of temporomandibular joint)       Past Surgical History:   Procedure Laterality Date    BREAST ENHANCEMENT SURGERY      HERNIA REPAIR      HYSTERECTOMY, VAGINAL      WISDOM TOOTH EXTRACTION       Family History   Problem Relation Age of Onset    Cancer Mother         breast cancer    Other Sister         NO KNOWN ANEURYSMS IN THE FAMILY    Other Maternal Grandmother         no known congenital heart disease in family               Review of Systems   Constitutional:  Positive for activity change, appetite change, chills, fatigue and fever.   HENT:  Positive for congestion, ear pain, rhinorrhea, sinus pressure, sinus pain and sore throat.    Respiratory:  Positive for cough and shortness of breath (exertional). Negative for chest tightness.    Gastrointestinal:  Negative for abdominal pain and nausea. Diarrhea: loose yesterday.  Musculoskeletal:  Positive for myalgias.   Neurological:  Positive for dizziness and headaches. Negative for light-headedness.   Psychiatric/Behavioral:  Positive for sleep disturbance.          PMH, Surgical Hx, Family Hx, and Social Hx reviewed and

## 2024-01-23 LAB
DHEA-S SERPL-MCNC: 205 UG/DL (ref 35.4–256)
SHBG SERPL-SCNC: 57 NMOL/L (ref 30–135)
TESTOST FREE SERPL-MCNC: 4.5 PG/ML (ref 1.1–5.8)
TESTOST SERPL-MCNC: 36 NG/DL (ref 20–70)

## 2024-01-24 DIAGNOSIS — U07.1 COVID-19: Primary | ICD-10-CM

## 2024-01-24 RX ORDER — DEXTROMETHORPHAN HYDROBROMIDE AND PROMETHAZINE HYDROCHLORIDE 15; 6.25 MG/5ML; MG/5ML
5 SYRUP ORAL 4 TIMES DAILY PRN
Qty: 180 ML | Refills: 0 | Status: SHIPPED | OUTPATIENT
Start: 2024-01-24 | End: 2024-02-03

## 2024-01-25 LAB
ESTRADIOL SERPL HS-MCNC: 8.7 PG/ML
ESTROGEN SERPL CALC-MCNC: 38.4 PG/ML
ESTRONE SERPL-MCNC: 29.7 PG/ML
PROGEST SERPL-MCNC: <0.1 NG/ML

## 2024-01-30 ENCOUNTER — OFFICE VISIT (OUTPATIENT)
Dept: FAMILY MEDICINE CLINIC | Age: 44
End: 2024-01-30
Payer: COMMERCIAL

## 2024-01-30 VITALS
HEIGHT: 65 IN | OXYGEN SATURATION: 98 % | BODY MASS INDEX: 30.82 KG/M2 | SYSTOLIC BLOOD PRESSURE: 118 MMHG | DIASTOLIC BLOOD PRESSURE: 72 MMHG | TEMPERATURE: 98.1 F | WEIGHT: 185 LBS | HEART RATE: 86 BPM

## 2024-01-30 DIAGNOSIS — N95.1 MENOPAUSAL STATE: ICD-10-CM

## 2024-01-30 DIAGNOSIS — R50.9 FEVER, UNSPECIFIED FEVER CAUSE: Primary | ICD-10-CM

## 2024-01-30 DIAGNOSIS — R05.3 CHRONIC COUGH: ICD-10-CM

## 2024-01-30 DIAGNOSIS — R63.5 WEIGHT GAIN: ICD-10-CM

## 2024-01-30 PROCEDURE — 99214 OFFICE O/P EST MOD 30 MIN: CPT | Performed by: FAMILY MEDICINE

## 2024-01-30 RX ORDER — METHYLPREDNISOLONE 4 MG/1
TABLET ORAL
Qty: 1 KIT | Refills: 0 | Status: SHIPPED | OUTPATIENT
Start: 2024-01-30

## 2024-01-30 RX ORDER — INHALER, ASSIST DEVICES
1 SPACER (EA) MISCELLANEOUS DAILY
Qty: 1 EACH | Refills: 0 | Status: SHIPPED | OUTPATIENT
Start: 2024-01-30

## 2024-01-30 RX ORDER — PHENTERMINE HYDROCHLORIDE 37.5 MG/1
37.5 TABLET ORAL
Qty: 30 TABLET | Refills: 0 | Status: SHIPPED | OUTPATIENT
Start: 2024-01-30 | End: 2024-02-29

## 2024-01-30 RX ORDER — DOXYCYCLINE HYCLATE 100 MG
100 TABLET ORAL 2 TIMES DAILY
Qty: 14 TABLET | Refills: 0 | Status: SHIPPED | OUTPATIENT
Start: 2024-01-30 | End: 2024-02-06

## 2024-01-30 RX ORDER — ALBUTEROL SULFATE 90 UG/1
2 AEROSOL, METERED RESPIRATORY (INHALATION) 4 TIMES DAILY PRN
Qty: 18 G | Refills: 0 | Status: SHIPPED | OUTPATIENT
Start: 2024-01-30

## 2024-01-30 ASSESSMENT — ENCOUNTER SYMPTOMS: COUGH: 1

## 2024-01-30 NOTE — PROGRESS NOTES
Subjective  Verona Becerra 1980 is a 44 y.o. female who presents today with:  Chief Complaint   Patient presents with    Weight Management     She has been consuming 1800 calories daily or less. She does not know how many grams of protein she is consuming daily. States she had only ate 1 meal daily before and has been trying to consume more food overall throughout the day. She has been going to the gym for cardiac and weight training and doing wall pilates at home. Weight is stable since her last visit. She has not quit smoking.     Cough     C/O fever, chills, night sweats, body aches, headaches, fatigue, sinus congestion/drainage, left ear pressure, sore throat, cough, and SOB. Denies facial/tooth pain, wheezing, nausea, vomiting, or diarrhea. Symptoms started on 01/21. Tested positive for COVID on 01/24. She was prescribed Paxlovid, a steroid and cough syrup which she has taken as directed. Continues to take DayQuil, NyQuil, Motrin and Tylenol for relief with minimal effectiveness. She has been out of work since 01/22.        Weight Management  Associated symptoms include coughing.   Cough      I have reviewed HPI and agree with above.  Her labs were reviewed in depth.  DHE is normal.  Testosterone looks okay.  Estrogens are very low.  Progesterone is nonexistent.  She does continue to have hot flashes night sweats.  Her libido is good.  She does however have a lot of vaginal dryness.  She does still smoke.  She has strong family history of heart disease.  Her mom had hormone receptor positive breast cancer.  Her aunt is having bypass surgery this week.    Review of Systems   Respiratory:  Positive for cough.    All other systems reviewed and are negative.      Past Medical History:   Diagnosis Date    Mild intermittent asthma without complication     Tinea versicolor     TMJ (dislocation of temporomandibular joint)      Past Surgical History:   Procedure Laterality Date    BREAST ENHANCEMENT SURGERY

## 2024-03-04 ENCOUNTER — PATIENT MESSAGE (OUTPATIENT)
Dept: FAMILY MEDICINE CLINIC | Age: 44
End: 2024-03-04

## 2024-03-04 RX ORDER — PHENTERMINE HYDROCHLORIDE 37.5 MG/1
37.5 TABLET ORAL
Qty: 30 TABLET | Refills: 0 | Status: SHIPPED | OUTPATIENT
Start: 2024-03-04 | End: 2024-04-03

## 2024-03-04 NOTE — TELEPHONE ENCOUNTER
Patient is requesting medication refill. Please approve or deny this request.    Rx requested:  Requested Prescriptions     Pending Prescriptions Disp Refills    phentermine (ADIPEX-P) 37.5 MG tablet 30 tablet 0     Sig: Take 1 tablet by mouth every morning (before breakfast) for 30 days. Max Daily Amount: 37.5 mg         Last Office Visit:   1/30/2024      Next Visit Date:  Future Appointments   Date Time Provider Department Center   3/13/2024 10:00 AM John Paul Robledo DO [facility] [address]

## 2024-03-04 NOTE — TELEPHONE ENCOUNTER
From: Verona Becerra  To: Dr. John Paul Robledo  Sent: 3/4/2024 12:46 PM EST  Subject: Diet pill    Hello, I only have two days worth of the diet pill left. It seems to be working. I’ve lost 5 pounds so far I tried to go on and request a refill, but it wasn’t showing up on my chart.

## 2024-03-13 ENCOUNTER — OFFICE VISIT (OUTPATIENT)
Dept: FAMILY MEDICINE CLINIC | Age: 44
End: 2024-03-13
Payer: COMMERCIAL

## 2024-03-13 VITALS
TEMPERATURE: 98 F | HEART RATE: 75 BPM | DIASTOLIC BLOOD PRESSURE: 62 MMHG | RESPIRATION RATE: 12 BRPM | WEIGHT: 171 LBS | SYSTOLIC BLOOD PRESSURE: 105 MMHG | BODY MASS INDEX: 28.46 KG/M2

## 2024-03-13 DIAGNOSIS — L98.9 SORE ON SCALP: ICD-10-CM

## 2024-03-13 PROCEDURE — 99213 OFFICE O/P EST LOW 20 MIN: CPT | Performed by: FAMILY MEDICINE

## 2024-03-13 RX ORDER — PHENTERMINE HYDROCHLORIDE 37.5 MG/1
37.5 TABLET ORAL
Qty: 30 TABLET | Refills: 0 | Status: SHIPPED | OUTPATIENT
Start: 2024-03-13 | End: 2024-04-12

## 2024-03-13 NOTE — PROGRESS NOTES
Subjective  Verona Becerra 1980 is a 44 y.o. female who presents today with:  Chief Complaint   Patient presents with    Follow-up     BMI.  Some sore on scalp       HPI  I have reviewed HPI and agree with above.  1 month follow-up weight loss medication.  She is tolerated well.  No dry mouth.  No problems sleeping.  She has lost about 14 pounds.  She is going to the gym twice a week.  She is making sure she is eating breakfast every day.    She has also been struggling with some sores around her hairline.  She used a product on her right ear but it caused a reaction.  There has not been any pus but she has been having some itching and she has been picking at the sores.    Review of Systems   All other systems reviewed and are negative.      Past Medical History:   Diagnosis Date    Mild intermittent asthma without complication     Tinea versicolor     TMJ (dislocation of temporomandibular joint)      Past Surgical History:   Procedure Laterality Date    BREAST ENHANCEMENT SURGERY      HERNIA REPAIR      HYSTERECTOMY, VAGINAL      WISDOM TOOTH EXTRACTION       Social History     Socioeconomic History    Marital status:      Spouse name: Not on file    Number of children: Not on file    Years of education: Not on file    Highest education level: Not on file   Occupational History    Not on file   Tobacco Use    Smoking status: Every Day     Current packs/day: 1.00     Average packs/day: 1 pack/day for 8.0 years (8.0 ttl pk-yrs)     Types: Cigarettes    Smokeless tobacco: Never   Vaping Use    Vaping Use: Every day    Devices: Disposable, Pre-filled or refillable cartridge   Substance and Sexual Activity    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    Drug use: No    Sexual activity: Not Currently     Partners: Male     Comment:     Other Topics Concern    Not on file   Social History Narrative    Not on file     Social Determinants of Health     Financial Resource Strain: Low Risk

## 2024-04-12 ENCOUNTER — OFFICE VISIT (OUTPATIENT)
Dept: FAMILY MEDICINE CLINIC | Age: 44
End: 2024-04-12

## 2024-04-12 VITALS
SYSTOLIC BLOOD PRESSURE: 136 MMHG | TEMPERATURE: 98.4 F | WEIGHT: 174.8 LBS | HEIGHT: 65 IN | BODY MASS INDEX: 29.12 KG/M2 | OXYGEN SATURATION: 98 % | DIASTOLIC BLOOD PRESSURE: 82 MMHG | HEART RATE: 78 BPM

## 2024-04-12 RX ORDER — PHENTERMINE HYDROCHLORIDE 37.5 MG/1
37.5 TABLET ORAL
Qty: 30 TABLET | Refills: 0 | Status: SHIPPED | OUTPATIENT
Start: 2024-04-12 | End: 2024-05-12

## 2024-04-12 NOTE — PROGRESS NOTES
Food Insecurity: Not on file (10/30/2023)   Transportation Needs: Unknown (10/30/2023)    PRAPARE - Transportation     Lack of Transportation (Medical): Not on file     Lack of Transportation (Non-Medical): No   Physical Activity: Insufficiently Active (10/30/2023)    Exercise Vital Sign     Days of Exercise per Week: 3 days     Minutes of Exercise per Session: 30 min   Stress: Not on file   Social Connections: Not on file   Intimate Partner Violence: Not At Risk (10/30/2023)    Humiliation, Afraid, Rape, and Kick questionnaire     Fear of Current or Ex-Partner: No     Emotionally Abused: No     Physically Abused: No     Sexually Abused: No   Housing Stability: Unknown (10/30/2023)    Housing Stability Vital Sign     Unable to Pay for Housing in the Last Year: Not on file     Number of Places Lived in the Last Year: Not on file     Unstable Housing in the Last Year: No     Family History   Problem Relation Age of Onset    Cancer Mother         breast cancer    Other Sister         NO KNOWN ANEURYSMS IN THE FAMILY    Other Maternal Grandmother         no known congenital heart disease in family     Allergies   Allergen Reactions    Sulfa Antibiotics      Current Outpatient Medications   Medication Sig Dispense Refill    phentermine (ADIPEX-P) 37.5 MG tablet Take 1 tablet by mouth every morning (before breakfast) for 30 days. Max Daily Amount: 37.5 mg 30 tablet 0    albuterol sulfate HFA (VENTOLIN HFA) 108 (90 Base) MCG/ACT inhaler Inhale 2 puffs into the lungs 4 times daily as needed for Wheezing 18 g 0    Spacer/Aero-Holding Chambers (AEROCHAMBER MV) MISC 1 each by Does not apply route daily 1 each 0    albuterol sulfate HFA (VENTOLIN HFA) 108 (90 Base) MCG/ACT inhaler Inhale 1 puff into the lungs every 6 hours as needed for Wheezing or Shortness of Breath 1 each 5    levocetirizine (XYZAL) 5 MG tablet Take 1 tablet by mouth nightly as needed (allergies) 90 tablet 3    Multiple Vitamin (MULTIVITAMIN ADULT PO) Take

## 2024-05-06 ENCOUNTER — OFFICE VISIT (OUTPATIENT)
Dept: FAMILY MEDICINE CLINIC | Age: 44
End: 2024-05-06
Payer: COMMERCIAL

## 2024-05-06 ENCOUNTER — HOSPITAL ENCOUNTER (OUTPATIENT)
Dept: CT IMAGING | Age: 44
Discharge: HOME | End: 2024-05-08
Payer: COMMERCIAL

## 2024-05-06 VITALS
TEMPERATURE: 97.7 F | SYSTOLIC BLOOD PRESSURE: 122 MMHG | BODY MASS INDEX: 29.13 KG/M2 | WEIGHT: 174.82 LBS | HEIGHT: 65 IN | RESPIRATION RATE: 12 BRPM | OXYGEN SATURATION: 100 % | HEART RATE: 81 BPM | DIASTOLIC BLOOD PRESSURE: 60 MMHG

## 2024-05-06 DIAGNOSIS — R31.29 MICROSCOPIC HEMATURIA: ICD-10-CM

## 2024-05-06 DIAGNOSIS — R10.9 ACUTE LEFT FLANK PAIN: ICD-10-CM

## 2024-05-06 DIAGNOSIS — R50.9 FEVER, UNSPECIFIED FEVER CAUSE: ICD-10-CM

## 2024-05-06 DIAGNOSIS — R10.12 LUQ PAIN: ICD-10-CM

## 2024-05-06 DIAGNOSIS — R10.12 LUQ PAIN: Primary | ICD-10-CM

## 2024-05-06 DIAGNOSIS — N20.0 RENAL CALCULI: ICD-10-CM

## 2024-05-06 LAB
ALBUMIN SERPL-MCNC: 4.4 G/DL (ref 3.5–4.6)
ALP SERPL-CCNC: 115 U/L (ref 40–130)
ALT SERPL-CCNC: 31 U/L (ref 0–33)
ANION GAP SERPL CALCULATED.3IONS-SCNC: 13 MEQ/L (ref 9–15)
AST SERPL-CCNC: 20 U/L (ref 0–35)
BASOPHILS # BLD: 0.1 K/UL (ref 0–0.2)
BASOPHILS NFR BLD: 0.7 %
BILIRUB SERPL-MCNC: <0.2 MG/DL (ref 0.2–0.7)
BILIRUBIN, POC: NORMAL
BLOOD URINE, POC: NORMAL
BUN SERPL-MCNC: 11 MG/DL (ref 6–20)
CALCIUM SERPL-MCNC: 9.4 MG/DL (ref 8.5–9.9)
CHLORIDE SERPL-SCNC: 103 MEQ/L (ref 95–107)
CLARITY, POC: CLEAR
CO2 SERPL-SCNC: 25 MEQ/L (ref 20–31)
COLOR, POC: YELLOW
CREAT SERPL-MCNC: 0.62 MG/DL (ref 0.5–0.9)
EOSINOPHIL # BLD: 0.1 K/UL (ref 0–0.7)
EOSINOPHIL NFR BLD: 1.7 %
ERYTHROCYTE [DISTWIDTH] IN BLOOD BY AUTOMATED COUNT: 13.4 % (ref 11.5–14.5)
GLOBULIN SER CALC-MCNC: 3.2 G/DL (ref 2.3–3.5)
GLUCOSE SERPL-MCNC: 102 MG/DL (ref 70–99)
GLUCOSE URINE, POC: NORMAL
HCT VFR BLD AUTO: 42.6 % (ref 37–47)
HGB BLD-MCNC: 14.1 G/DL (ref 12–16)
KETONES, POC: NORMAL
LEUKOCYTE EST, POC: NORMAL
LIPASE SERPL-CCNC: 49 U/L (ref 12–95)
LYMPHOCYTES # BLD: 3.1 K/UL (ref 1–4.8)
LYMPHOCYTES NFR BLD: 43.6 %
MCH RBC QN AUTO: 29.3 PG (ref 27–31.3)
MCHC RBC AUTO-ENTMCNC: 33.1 % (ref 33–37)
MCV RBC AUTO: 88.6 FL (ref 79.4–94.8)
MONOCYTES # BLD: 0.4 K/UL (ref 0.2–0.8)
MONOCYTES NFR BLD: 5.9 %
NEUTROPHILS # BLD: 3.4 K/UL (ref 1.4–6.5)
NEUTS SEG NFR BLD: 48 %
NITRITE, POC: NORMAL
PH, POC: 6
PLATELET # BLD AUTO: 296 K/UL (ref 130–400)
POTASSIUM SERPL-SCNC: 4.5 MEQ/L (ref 3.4–4.9)
PROT SERPL-MCNC: 7.6 G/DL (ref 6.3–8)
PROTEIN, POC: NORMAL
RBC # BLD AUTO: 4.81 M/UL (ref 4.2–5.4)
SODIUM SERPL-SCNC: 141 MEQ/L (ref 135–144)
SPECIFIC GRAVITY, POC: 1.02
UROBILINOGEN, POC: NORMAL
WBC # BLD AUTO: 7.1 K/UL (ref 4.8–10.8)

## 2024-05-06 PROCEDURE — 99213 OFFICE O/P EST LOW 20 MIN: CPT | Performed by: NURSE PRACTITIONER

## 2024-05-06 PROCEDURE — 6360000004 HC RX CONTRAST MEDICATION: Performed by: NURSE PRACTITIONER

## 2024-05-06 PROCEDURE — 81003 URINALYSIS AUTO W/O SCOPE: CPT | Performed by: NURSE PRACTITIONER

## 2024-05-06 PROCEDURE — 74177 CT ABD & PELVIS W/CONTRAST: CPT

## 2024-05-06 RX ORDER — CEPHALEXIN 500 MG/1
500 CAPSULE ORAL 2 TIMES DAILY
Qty: 14 CAPSULE | Refills: 0 | Status: SHIPPED | OUTPATIENT
Start: 2024-05-06 | End: 2024-05-13

## 2024-05-06 RX ORDER — TAMSULOSIN HYDROCHLORIDE 0.4 MG/1
0.4 CAPSULE ORAL DAILY
Qty: 5 CAPSULE | Refills: 0 | Status: SHIPPED | OUTPATIENT
Start: 2024-05-06 | End: 2024-05-08 | Stop reason: SINTOL

## 2024-05-06 RX ADMIN — IOPAMIDOL 75 ML: 755 INJECTION, SOLUTION INTRAVENOUS at 13:51

## 2024-05-06 ASSESSMENT — ENCOUNTER SYMPTOMS
ABDOMINAL PAIN: 1
DIARRHEA: 0
NAUSEA: 0
CHEST TIGHTNESS: 0
COUGH: 0

## 2024-05-06 NOTE — PROGRESS NOTES
05/06/24 1139   BP: 122/60   Site: Left Upper Arm   Position: Sitting   Cuff Size: Medium Adult   Pulse: 81   Resp: 12   Temp: 97.7 °F (36.5 °C)   TempSrc: Temporal   SpO2: 100%   Weight: 79.3 kg (174 lb 13.2 oz)   Height: 1.651 m (5' 5\")     BP Readings from Last 3 Encounters:   05/06/24 122/60   04/12/24 136/82   03/13/24 105/62     Wt Readings from Last 3 Encounters:   05/06/24 79.3 kg (174 lb 13.2 oz)   04/12/24 79.3 kg (174 lb 12.8 oz)   03/13/24 77.6 kg (171 lb)           Physical Exam  Vitals reviewed.   Constitutional:       General: She is awake. She is not in acute distress.     Appearance: She is not toxic-appearing.   HENT:      Right Ear: External ear normal.      Left Ear: External ear normal.      Nose: Nose normal.      Mouth/Throat:      Lips: Pink.      Mouth: Mucous membranes are moist.   Eyes:      General: Lids are normal.      Extraocular Movements: Extraocular movements intact.      Conjunctiva/sclera: Conjunctivae normal.   Cardiovascular:      Rate and Rhythm: Normal rate.   Pulmonary:      Effort: Pulmonary effort is normal.      Breath sounds: Normal breath sounds and air entry.   Abdominal:      General: There is no distension.      Palpations: Abdomen is soft.      Tenderness: There is abdominal tenderness in the left upper quadrant. There is left CVA tenderness. There is no right CVA tenderness, guarding or rebound.   Musculoskeletal:         General: Normal range of motion.      Cervical back: Normal range of motion. No rigidity.   Lymphadenopathy:      Cervical: No cervical adenopathy.   Skin:     General: Skin is warm and dry.      Capillary Refill: Capillary refill takes less than 2 seconds.      Coloration: Skin is not pale.   Neurological:      General: No focal deficit present.      Mental Status: She is alert and oriented to person, place, and time.      Motor: Motor function is intact.             Assessment & Plan    Diagnosis Orders   1. LUQ pain  Culture, Urine    POCT

## 2024-05-07 ENCOUNTER — TELEPHONE (OUTPATIENT)
Dept: FAMILY MEDICINE CLINIC | Age: 44
End: 2024-05-07

## 2024-05-07 LAB — BACTERIA UR CULT: NORMAL

## 2024-05-07 RX ORDER — PHENTERMINE HYDROCHLORIDE 37.5 MG/1
37.5 TABLET ORAL
Qty: 30 TABLET | Refills: 0 | Status: SHIPPED | OUTPATIENT
Start: 2024-05-07 | End: 2024-06-06

## 2024-05-07 NOTE — TELEPHONE ENCOUNTER
[facility] called to ask if patient is experiencing a Morbid Condition for medication Phentermine (Adeipex-P) 37.5MG due to BMI being below the requirement.

## 2024-05-15 ENCOUNTER — PATIENT MESSAGE (OUTPATIENT)
Dept: FAMILY MEDICINE CLINIC | Age: 44
End: 2024-05-15

## 2024-05-15 DIAGNOSIS — R10.9 LEFT FLANK PAIN: Primary | ICD-10-CM

## 2024-05-15 DIAGNOSIS — N28.9 KIDNEY LESION, NATIVE, LEFT: ICD-10-CM

## 2024-05-15 NOTE — TELEPHONE ENCOUNTER
From: Verona Becerra  To: Dr. John Paul Robledo  Sent: 5/15/2024 8:44 AM EDT  Subject: Kidney stone    Good Morning, I saw Dr. Park on 5-6 for back pain and fever. She sent me for a CT scan and it showed a kidney stone. They also called and said I had alot of bacteria in my urine. I finished the antibiotics, I had an allergic reaction too the Flomax. Still having back pain and then all of a sudden it will get really bad like Im in labor and then go away. Is this normal? Also how long until the stone passes? Im not enjoying this at all.

## 2024-05-17 ENCOUNTER — PATIENT MESSAGE (OUTPATIENT)
Dept: FAMILY MEDICINE CLINIC | Age: 44
End: 2024-05-17

## 2024-05-17 ENCOUNTER — HOSPITAL ENCOUNTER (OUTPATIENT)
Dept: ULTRASOUND IMAGING | Age: 44
Discharge: HOME | End: 2024-05-17
Payer: COMMERCIAL

## 2024-05-17 DIAGNOSIS — N28.9 KIDNEY LESION, NATIVE, LEFT: ICD-10-CM

## 2024-05-17 DIAGNOSIS — R10.9 LEFT FLANK PAIN: ICD-10-CM

## 2024-05-17 PROCEDURE — 76775 US EXAM ABDO BACK WALL LIM: CPT

## 2024-05-17 NOTE — TELEPHONE ENCOUNTER
From: Dr. John Paul Robledo  To: Verona Becerra  Sent: 5/17/2024 1:35 PM EDT  Subject: US Rhoades,    The lesion in the left kidney is a simple cyst. No worries. How is the pain? If not improving come see me next week.

## 2024-06-07 RX ORDER — PHENTERMINE HYDROCHLORIDE 37.5 MG/1
37.5 TABLET ORAL
Qty: 30 TABLET | Refills: 0 | Status: SHIPPED | OUTPATIENT
Start: 2024-06-07 | End: 2024-07-07

## 2024-06-10 DIAGNOSIS — F33.0 MILD EPISODE OF RECURRENT MAJOR DEPRESSIVE DISORDER (HCC): Primary | ICD-10-CM

## 2024-06-10 RX ORDER — PHENTERMINE HYDROCHLORIDE 37.5 MG/1
37.5 TABLET ORAL
Qty: 30 TABLET | Refills: 0 | Status: CANCELLED | OUTPATIENT
Start: 2024-06-10 | End: 2024-07-10

## 2024-06-11 DIAGNOSIS — F33.0 MILD EPISODE OF RECURRENT MAJOR DEPRESSIVE DISORDER (HCC): Primary | ICD-10-CM

## 2024-06-11 RX ORDER — PHENTERMINE HYDROCHLORIDE 37.5 MG/1
37.5 TABLET ORAL
Qty: 30 TABLET | Refills: 0 | Status: SHIPPED | OUTPATIENT
Start: 2024-06-11 | End: 2024-07-11

## 2024-06-20 ENCOUNTER — OFFICE VISIT (OUTPATIENT)
Dept: FAMILY MEDICINE CLINIC | Age: 44
End: 2024-06-20
Payer: COMMERCIAL

## 2024-06-20 VITALS
HEART RATE: 76 BPM | BODY MASS INDEX: 28.46 KG/M2 | OXYGEN SATURATION: 98 % | TEMPERATURE: 97.1 F | WEIGHT: 170.8 LBS | HEIGHT: 65 IN | SYSTOLIC BLOOD PRESSURE: 108 MMHG | DIASTOLIC BLOOD PRESSURE: 70 MMHG

## 2024-06-20 DIAGNOSIS — F33.0 MILD EPISODE OF RECURRENT MAJOR DEPRESSIVE DISORDER (HCC): ICD-10-CM

## 2024-06-20 DIAGNOSIS — N95.1 MENOPAUSAL SWEATS: ICD-10-CM

## 2024-06-20 PROCEDURE — 99213 OFFICE O/P EST LOW 20 MIN: CPT | Performed by: FAMILY MEDICINE

## 2024-07-01 ENCOUNTER — TELEPHONE (OUTPATIENT)
Dept: FAMILY MEDICINE CLINIC | Age: 44
End: 2024-07-01

## 2024-07-08 DIAGNOSIS — F33.0 MILD EPISODE OF RECURRENT MAJOR DEPRESSIVE DISORDER (HCC): ICD-10-CM

## 2024-07-09 RX ORDER — PHENTERMINE HYDROCHLORIDE 37.5 MG/1
37.5 TABLET ORAL
Qty: 30 TABLET | Refills: 0 | Status: SHIPPED | OUTPATIENT
Start: 2024-07-09 | End: 2024-08-08

## 2024-08-08 DIAGNOSIS — F33.0 MILD EPISODE OF RECURRENT MAJOR DEPRESSIVE DISORDER (HCC): ICD-10-CM

## 2024-08-08 RX ORDER — PHENTERMINE HYDROCHLORIDE 37.5 MG/1
37.5 TABLET ORAL
Qty: 30 TABLET | Refills: 0 | Status: CANCELLED | OUTPATIENT
Start: 2024-08-08 | End: 2024-09-07

## 2024-08-09 NOTE — TELEPHONE ENCOUNTER
Rx requested:  Requested Prescriptions     Pending Prescriptions Disp Refills    phentermine (ADIPEX-P) 37.5 MG tablet 30 tablet 0     Sig: Take 1 tablet by mouth every morning (before breakfast) for 30 days. Max Daily Amount: 37.5 mg         Last Office Visit:   Visit date not found      Next Visit Date:  Future Appointments   Date Time Provider Department Center   9/23/2024 10:00 AM John Paul Robledo DO [facility] DE[facility]

## 2024-08-12 DIAGNOSIS — F33.0 MILD EPISODE OF RECURRENT MAJOR DEPRESSIVE DISORDER (HCC): ICD-10-CM

## 2024-08-12 RX ORDER — PHENTERMINE HYDROCHLORIDE 37.5 MG/1
37.5 TABLET ORAL
Qty: 30 TABLET | Refills: 0 | Status: SHIPPED | OUTPATIENT
Start: 2024-08-12 | End: 2024-09-11

## 2024-08-30 ENCOUNTER — OFFICE VISIT (OUTPATIENT)
Dept: FAMILY MEDICINE CLINIC | Age: 44
End: 2024-08-30

## 2024-08-30 ENCOUNTER — TELEPHONE (OUTPATIENT)
Dept: GASTROENTEROLOGY | Age: 44
End: 2024-08-30

## 2024-08-30 ENCOUNTER — TELEPHONE (OUTPATIENT)
Dept: FAMILY MEDICINE CLINIC | Age: 44
End: 2024-08-30

## 2024-08-30 VITALS
DIASTOLIC BLOOD PRESSURE: 110 MMHG | HEART RATE: 84 BPM | OXYGEN SATURATION: 98 % | SYSTOLIC BLOOD PRESSURE: 140 MMHG | TEMPERATURE: 97.5 F | HEIGHT: 65 IN | BODY MASS INDEX: 27.32 KG/M2 | WEIGHT: 164 LBS

## 2024-08-30 DIAGNOSIS — J01.90 ACUTE BACTERIAL SINUSITIS: ICD-10-CM

## 2024-08-30 DIAGNOSIS — B96.89 ACUTE BACTERIAL SINUSITIS: ICD-10-CM

## 2024-08-30 DIAGNOSIS — J02.9 SORE THROAT: ICD-10-CM

## 2024-08-30 DIAGNOSIS — H65.01 NON-RECURRENT ACUTE SEROUS OTITIS MEDIA OF RIGHT EAR: Primary | ICD-10-CM

## 2024-08-30 LAB
INFLUENZA A ANTIBODY: NORMAL
INFLUENZA B ANTIBODY: NORMAL
Lab: NORMAL
PERFORMING INSTRUMENT: NORMAL
QC PASS/FAIL: NORMAL
S PYO AG THROAT QL: NORMAL
SARS-COV-2, POC: NORMAL

## 2024-08-30 ASSESSMENT — ENCOUNTER SYMPTOMS
DIARRHEA: 0
ABDOMINAL PAIN: 0
SINUS PRESSURE: 1
NAUSEA: 0
STRIDOR: 0
COUGH: 1
FACIAL SWELLING: 1
VOMITING: 0
RHINORRHEA: 1
SINUS PAIN: 1
WHEEZING: 0
VOICE CHANGE: 0
TROUBLE SWALLOWING: 0
SHORTNESS OF BREATH: 0
CHEST TIGHTNESS: 0
SORE THROAT: 0

## 2024-08-30 NOTE — PROGRESS NOTES
needed (allergies) 90 tablet 3    Multiple Vitamin (MULTIVITAMIN ADULT PO) Take by mouth      fluticasone (FLOVENT HFA) 110 MCG/ACT inhaler Inhale 2 puffs into the lungs daily as needed (wheezing or shortness of breath) 1 Inhaler 1     No current facility-administered medications for this visit.          Review of Systems   Constitutional:  Positive for fatigue. Negative for activity change, appetite change, chills, diaphoresis, fever and unexpected weight change.   HENT:  Positive for congestion, ear pain, facial swelling, rhinorrhea, sinus pressure and sinus pain. Negative for ear discharge, sneezing, sore throat, tinnitus, trouble swallowing and voice change.    Respiratory:  Positive for cough. Negative for chest tightness, shortness of breath, wheezing and stridor.    Cardiovascular:  Negative for chest pain.   Gastrointestinal:  Negative for abdominal pain, diarrhea, nausea and vomiting.   Musculoskeletal:  Negative for arthralgias and myalgias.   Skin:  Positive for rash.   Neurological:  Positive for headaches. Negative for dizziness, weakness and light-headedness.   Hematological:  Negative for adenopathy.       Objective:   BP (!) 140/110 (Site: Left Upper Arm, Position: Sitting, Cuff Size: Medium Adult)   Pulse 84   Temp 97.5 °F (36.4 °C)   Ht 1.651 m (5' 5\")   Wt 74.4 kg (164 lb)   SpO2 98%   BMI 27.29 kg/m²     Physical Exam  Vitals reviewed.   Constitutional:       General: She is awake. She is not in acute distress.     Appearance: Normal appearance. She is well-developed, well-groomed and normal weight. She is not ill-appearing, toxic-appearing or diaphoretic.   HENT:      Head: Normocephalic and atraumatic.      Right Ear: Hearing, ear canal and external ear normal. A middle ear effusion is present. Tympanic membrane is bulging.      Left Ear: Hearing, ear canal and external ear normal. A middle ear effusion is present.      Nose: Congestion present.      Right Turbinates: Swollen.      Left  throat  POCT COVID-19, Antigen    POCT rapid strep A    POCT Influenza A/B        Results for POC orders placed in visit on 08/30/24   POCT Influenza A/B   Result Value Ref Range    Influenza A Ab neg     Influenza B Ab neg    POCT rapid strep A   Result Value Ref Range    Strep A Ag None Detected None Detected      Plan:     Assessment & Plan   Verona was seen today for uri.    Diagnoses and all orders for this visit:    Non-recurrent acute serous otitis media of right ear  -     amoxicillin-clavulanate (AUGMENTIN) 875-125 MG per tablet; Take 1 tablet by mouth 2 times daily for 10 days    Acute bacterial sinusitis  -     amoxicillin-clavulanate (AUGMENTIN) 875-125 MG per tablet; Take 1 tablet by mouth 2 times daily for 10 days    Sore throat  -     POCT COVID-19, Antigen  -     POCT rapid strep A  -     POCT Influenza A/B    Discussed with patient Flu, Covid, and strep all neg.   Will treat for bacterial sinus infection. Advised to take ANTB with food and increase fluids. Discussed administration and SE of the ANTB as well as using probiotic or eating yogurt while on the ANTB.  Advised may use OTC decongestant and nasal steroid to help with symptoms as needed. Discussed with patient should start to see improvement within 3-4 days and should f/u with worsening sx or with no improvement.     Antibiotic Instructions:   Complete the full course of antibiotics as ordered.  To prevent antibiotic resistances please take medication as ordered and for the full duration even if you start to feel better.     Take each dose with a small snack or meal to lessen potential GI upset.  Consider intake of yogurt or probiotic during antibiotic use and for a few days after to help reduce the risk of developing a secondary infection.  Take the yogurt or probiotic at least 2 hours after taking the antibiotic.  Avoid alcohol while taking antibiotics.    If you are using contraception please use a back up method of contraception such as

## 2024-08-30 NOTE — TELEPHONE ENCOUNTER
Called [facility] scheduling LMOM.Please call [phone] ext [phone] schedule colon screening .

## 2024-09-05 ENCOUNTER — OFFICE VISIT (OUTPATIENT)
Dept: FAMILY MEDICINE CLINIC | Age: 44
End: 2024-09-05

## 2024-09-05 VITALS
SYSTOLIC BLOOD PRESSURE: 132 MMHG | OXYGEN SATURATION: 99 % | DIASTOLIC BLOOD PRESSURE: 76 MMHG | HEART RATE: 81 BPM | TEMPERATURE: 97.8 F | HEIGHT: 65 IN | BODY MASS INDEX: 27.32 KG/M2 | WEIGHT: 164 LBS

## 2024-09-05 DIAGNOSIS — F33.0 MILD EPISODE OF RECURRENT MAJOR DEPRESSIVE DISORDER (HCC): ICD-10-CM

## 2024-09-05 DIAGNOSIS — J41.0 SIMPLE CHRONIC BRONCHITIS (HCC): ICD-10-CM

## 2024-09-05 DIAGNOSIS — N95.1 MENOPAUSAL SWEATS: ICD-10-CM

## 2024-09-05 DIAGNOSIS — F41.0 PANIC ATTACKS: ICD-10-CM

## 2024-09-05 RX ORDER — PROGESTERONE 100 MG/1
100 CAPSULE ORAL NIGHTLY
Qty: 30 CAPSULE | Refills: 1 | Status: SHIPPED | OUTPATIENT
Start: 2024-09-05

## 2024-09-05 RX ORDER — METHYLPREDNISOLONE 4 MG
TABLET, DOSE PACK ORAL
Qty: 1 KIT | Refills: 0 | Status: SHIPPED | OUTPATIENT
Start: 2024-09-05

## 2024-09-05 RX ORDER — PHENTERMINE HYDROCHLORIDE 37.5 MG/1
37.5 TABLET ORAL
Qty: 30 TABLET | Refills: 2 | Status: SHIPPED | OUTPATIENT
Start: 2024-09-05 | End: 2024-12-04

## 2024-09-05 RX ORDER — HYDROXYZINE HYDROCHLORIDE 25 MG/1
25 TABLET, FILM COATED ORAL DAILY PRN
Qty: 30 TABLET | Refills: 0 | Status: SHIPPED | OUTPATIENT
Start: 2024-09-05 | End: 2024-10-05

## 2024-09-05 NOTE — PROGRESS NOTES
Medication Sig Dispense Refill    progesterone (PROMETRIUM) 100 MG CAPS capsule Take 1 capsule by mouth nightly 30 capsule 1    hydrOXYzine HCl (ATARAX) 25 MG tablet Take 1 tablet by mouth daily as needed for Anxiety 30 tablet 0    methylPREDNISolone (MEDROL DOSEPACK) 4 MG tablet Take by mouth. 1 kit 0    phentermine (ADIPEX-P) 37.5 MG tablet Take 1 tablet by mouth every morning (before breakfast) for 90 days. Max Daily Amount: 37.5 mg 30 tablet 2    amoxicillin-clavulanate (AUGMENTIN) 875-125 MG per tablet Take 1 tablet by mouth 2 times daily for 10 days 20 tablet 0    albuterol sulfate HFA (VENTOLIN HFA) 108 (90 Base) MCG/ACT inhaler Inhale 2 puffs into the lungs 4 times daily as needed for Wheezing 18 g 0    Spacer/Aero-Holding Chambers (AEROCHAMBER MV) MISC 1 each by Does not apply route daily 1 each 0    albuterol sulfate HFA (VENTOLIN HFA) 108 (90 Base) MCG/ACT inhaler Inhale 1 puff into the lungs every 6 hours as needed for Wheezing or Shortness of Breath 1 each 5    levocetirizine (XYZAL) 5 MG tablet Take 1 tablet by mouth nightly as needed (allergies) 90 tablet 3    Multiple Vitamin (MULTIVITAMIN ADULT PO) Take by mouth      fluticasone (FLOVENT HFA) 110 MCG/ACT inhaler Inhale 2 puffs into the lungs daily as needed (wheezing or shortness of breath) 1 Inhaler 1     No current facility-administered medications for this visit.       PMH, Surgical Hx, Family Hx, and Social Hx reviewed and updated.  Health Maintenance reviewed.    Objective  Vitals:    09/05/24 0917   BP: 132/76   Pulse: 81   Temp: 97.8 °F (36.6 °C)   TempSrc: Temporal   SpO2: 99%   Weight: 74.4 kg (164 lb)   Height: 1.651 m (5' 5\")     BP Readings from Last 3 Encounters:   09/05/24 132/76   08/30/24 (!) 140/110   06/20/24 108/70     Wt Readings from Last 3 Encounters:   09/05/24 74.4 kg (164 lb)   08/30/24 74.4 kg (164 lb)   06/20/24 77.5 kg (170 lb 12.8 oz)       Lab Results   Component Value Date    LABA1C 5.6 09/01/2021     Lab Results

## 2024-09-09 ENCOUNTER — PATIENT MESSAGE (OUTPATIENT)
Dept: FAMILY MEDICINE CLINIC | Age: 44
End: 2024-09-09

## 2024-09-17 ENCOUNTER — OFFICE VISIT (OUTPATIENT)
Dept: FAMILY MEDICINE CLINIC | Age: 44
End: 2024-09-17

## 2024-09-17 VITALS
BODY MASS INDEX: 27.86 KG/M2 | WEIGHT: 167.2 LBS | HEIGHT: 65 IN | TEMPERATURE: 98.4 F | SYSTOLIC BLOOD PRESSURE: 130 MMHG | OXYGEN SATURATION: 97 % | DIASTOLIC BLOOD PRESSURE: 88 MMHG | HEART RATE: 79 BPM

## 2024-09-17 DIAGNOSIS — F41.0 PANIC ATTACKS: ICD-10-CM

## 2024-09-17 DIAGNOSIS — B34.9 VIRAL ILLNESS: Primary | ICD-10-CM

## 2024-09-17 DIAGNOSIS — J45.998 POST VIRAL RAD (REACTIVE AIRWAY DISEASE): ICD-10-CM

## 2024-09-17 RX ORDER — BENZONATATE 200 MG/1
200 CAPSULE ORAL 3 TIMES DAILY PRN
Qty: 21 CAPSULE | Refills: 0 | Status: SHIPPED | OUTPATIENT
Start: 2024-09-17 | End: 2024-09-24

## 2024-09-17 RX ORDER — DOXYCYCLINE HYCLATE 100 MG
100 TABLET ORAL 2 TIMES DAILY
Qty: 20 TABLET | Refills: 0 | Status: SHIPPED | OUTPATIENT
Start: 2024-09-17 | End: 2024-09-27

## 2024-09-17 RX ORDER — TRIAMCINOLONE ACETONIDE 40 MG/ML
80 INJECTION, SUSPENSION INTRA-ARTICULAR; INTRAMUSCULAR ONCE
Status: COMPLETED | OUTPATIENT
Start: 2024-09-17 | End: 2024-09-17

## 2024-09-17 RX ORDER — BUSPIRONE HYDROCHLORIDE 5 MG/1
5 TABLET ORAL 3 TIMES DAILY
Qty: 40 TABLET | Refills: 0 | Status: SHIPPED | OUTPATIENT
Start: 2024-09-17 | End: 2024-10-17

## 2024-09-17 RX ADMIN — TRIAMCINOLONE ACETONIDE 80 MG: 40 INJECTION, SUSPENSION INTRA-ARTICULAR; INTRAMUSCULAR at 09:37

## 2024-09-19 ENCOUNTER — PATIENT MESSAGE (OUTPATIENT)
Dept: FAMILY MEDICINE CLINIC | Age: 44
End: 2024-09-19

## 2024-10-02 ENCOUNTER — OFFICE VISIT (OUTPATIENT)
Dept: FAMILY MEDICINE CLINIC | Age: 44
End: 2024-10-02
Payer: COMMERCIAL

## 2024-10-02 VITALS
OXYGEN SATURATION: 98 % | BODY MASS INDEX: 27.49 KG/M2 | HEART RATE: 81 BPM | HEIGHT: 65 IN | WEIGHT: 165 LBS | DIASTOLIC BLOOD PRESSURE: 80 MMHG | TEMPERATURE: 97.7 F | SYSTOLIC BLOOD PRESSURE: 122 MMHG

## 2024-10-02 DIAGNOSIS — F41.0 PANIC ATTACKS: ICD-10-CM

## 2024-10-02 DIAGNOSIS — J45.998 POST VIRAL RAD (REACTIVE AIRWAY DISEASE): ICD-10-CM

## 2024-10-02 DIAGNOSIS — F41.1 GENERALIZED ANXIETY DISORDER: Primary | ICD-10-CM

## 2024-10-02 PROCEDURE — 99213 OFFICE O/P EST LOW 20 MIN: CPT | Performed by: FAMILY MEDICINE

## 2024-10-02 RX ORDER — LORAZEPAM 0.5 MG/1
0.5 TABLET ORAL 2 TIMES DAILY PRN
Qty: 28 TABLET | Refills: 0 | Status: SHIPPED | OUTPATIENT
Start: 2024-10-02 | End: 2024-10-16

## 2024-10-02 RX ORDER — ESCITALOPRAM OXALATE 5 MG/1
5 TABLET ORAL DAILY
Qty: 30 TABLET | Refills: 5 | Status: SHIPPED | OUTPATIENT
Start: 2024-10-02

## 2024-10-02 NOTE — PROGRESS NOTES
DOSEPACK) 4 MG tablet LIST CLEANUP    LORazepam (ATIVAN) 0.5 MG tablet REORDER     No follow-ups on file.        Reviewed with the patient: current clinical status, medications, activities and diet.     Side effects, adverse effects of the medication prescribed today, as well as treatment plan/ rationale and result expectations have been discussed with the patient who expresses understanding and desires to proceed.    Close follow up to evaluate treatment results and for coordination of care.  I have reviewed the patient's medical history in detail and updated the computerized patient record.    NEVILLE LARSON, DO

## 2024-10-21 ENCOUNTER — PATIENT MESSAGE (OUTPATIENT)
Dept: FAMILY MEDICINE CLINIC | Age: 44
End: 2024-10-21

## 2024-10-21 DIAGNOSIS — F41.0 PANIC ATTACKS: ICD-10-CM

## 2024-10-21 RX ORDER — LORAZEPAM 0.5 MG/1
0.5 TABLET ORAL 2 TIMES DAILY PRN
Qty: 28 TABLET | Refills: 0 | Status: SHIPPED | OUTPATIENT
Start: 2024-10-21 | End: 2024-11-04

## 2024-10-30 DIAGNOSIS — J30.81 ALLERGIC RHINITIS DUE TO ANIMAL HAIR AND DANDER: ICD-10-CM

## 2024-10-30 RX ORDER — LEVOCETIRIZINE DIHYDROCHLORIDE 5 MG/1
TABLET, FILM COATED ORAL
Qty: 90 TABLET | Refills: 3 | Status: SHIPPED | OUTPATIENT
Start: 2024-10-30

## 2024-11-04 ENCOUNTER — PATIENT MESSAGE (OUTPATIENT)
Dept: FAMILY MEDICINE CLINIC | Age: 44
End: 2024-11-04

## 2024-11-04 DIAGNOSIS — N95.1 MENOPAUSAL SWEATS: ICD-10-CM

## 2024-11-04 RX ORDER — PROGESTERONE 100 MG/1
100 CAPSULE ORAL NIGHTLY
Qty: 90 CAPSULE | Refills: 1 | Status: SHIPPED | OUTPATIENT
Start: 2024-11-04

## 2024-11-08 DIAGNOSIS — F41.0 PANIC ATTACKS: ICD-10-CM

## 2024-11-08 DIAGNOSIS — F41.1 GENERALIZED ANXIETY DISORDER: ICD-10-CM

## 2024-11-08 RX ORDER — ESCITALOPRAM OXALATE 5 MG/1
5 TABLET ORAL DAILY
Qty: 30 TABLET | Refills: 5 | Status: CANCELLED | OUTPATIENT
Start: 2024-11-08

## 2024-11-10 RX ORDER — LORAZEPAM 0.5 MG/1
0.5 TABLET ORAL 2 TIMES DAILY PRN
Qty: 28 TABLET | Refills: 0 | Status: SHIPPED | OUTPATIENT
Start: 2024-11-10 | End: 2024-11-24

## 2024-12-04 SDOH — ECONOMIC STABILITY: INCOME INSECURITY: HOW HARD IS IT FOR YOU TO PAY FOR THE VERY BASICS LIKE FOOD, HOUSING, MEDICAL CARE, AND HEATING?: NOT VERY HARD

## 2024-12-04 SDOH — ECONOMIC STABILITY: FOOD INSECURITY: WITHIN THE PAST 12 MONTHS, YOU WORRIED THAT YOUR FOOD WOULD RUN OUT BEFORE YOU GOT MONEY TO BUY MORE.: NEVER TRUE

## 2024-12-04 SDOH — ECONOMIC STABILITY: FOOD INSECURITY: WITHIN THE PAST 12 MONTHS, THE FOOD YOU BOUGHT JUST DIDN'T LAST AND YOU DIDN'T HAVE MONEY TO GET MORE.: SOMETIMES TRUE

## 2024-12-04 SDOH — ECONOMIC STABILITY: TRANSPORTATION INSECURITY
IN THE PAST 12 MONTHS, HAS LACK OF TRANSPORTATION KEPT YOU FROM MEETINGS, WORK, OR FROM GETTING THINGS NEEDED FOR DAILY LIVING?: NO

## 2024-12-05 ENCOUNTER — OFFICE VISIT (OUTPATIENT)
Dept: FAMILY MEDICINE CLINIC | Age: 44
End: 2024-12-05

## 2024-12-05 VITALS
WEIGHT: 171.4 LBS | OXYGEN SATURATION: 98 % | HEART RATE: 76 BPM | HEIGHT: 65 IN | BODY MASS INDEX: 28.56 KG/M2 | TEMPERATURE: 97 F

## 2024-12-05 DIAGNOSIS — N95.1 MENOPAUSAL SWEATS: ICD-10-CM

## 2024-12-05 DIAGNOSIS — K04.7 DENTAL ABSCESS: ICD-10-CM

## 2024-12-05 DIAGNOSIS — F41.1 GENERALIZED ANXIETY DISORDER: Primary | ICD-10-CM

## 2024-12-05 DIAGNOSIS — F41.0 PANIC ATTACKS: ICD-10-CM

## 2024-12-05 RX ORDER — AMOXICILLIN 875 MG/1
875 TABLET, COATED ORAL 2 TIMES DAILY
Qty: 14 TABLET | Refills: 0 | Status: SHIPPED | OUTPATIENT
Start: 2024-12-05 | End: 2024-12-12

## 2024-12-05 RX ORDER — LORAZEPAM 0.5 MG/1
0.5 TABLET ORAL 2 TIMES DAILY PRN
Qty: 20 TABLET | Refills: 0 | Status: SHIPPED | OUTPATIENT
Start: 2024-12-05 | End: 2024-12-15

## 2024-12-05 RX ORDER — PHENTERMINE HYDROCHLORIDE 37.5 MG/1
TABLET ORAL
COMMUNITY
Start: 2024-11-24

## 2024-12-05 RX ORDER — ESTRADIOL 0.1 MG/G
1 CREAM VAGINAL DAILY
Qty: 42.5 G | Refills: 3 | Status: SHIPPED | OUTPATIENT
Start: 2024-12-05

## 2024-12-19 DIAGNOSIS — F41.0 PANIC ATTACKS: ICD-10-CM

## 2024-12-19 RX ORDER — LORAZEPAM 0.5 MG/1
0.5 TABLET ORAL 2 TIMES DAILY PRN
Qty: 20 TABLET | Refills: 0 | Status: SHIPPED | OUTPATIENT
Start: 2024-12-19 | End: 2024-12-29

## 2024-12-26 ENCOUNTER — OFFICE VISIT (OUTPATIENT)
Age: 44
End: 2024-12-26
Payer: COMMERCIAL

## 2024-12-26 VITALS
OXYGEN SATURATION: 99 % | SYSTOLIC BLOOD PRESSURE: 126 MMHG | TEMPERATURE: 97.9 F | HEART RATE: 79 BPM | BODY MASS INDEX: 28.46 KG/M2 | DIASTOLIC BLOOD PRESSURE: 84 MMHG | WEIGHT: 171 LBS

## 2024-12-26 DIAGNOSIS — L98.9 SORE ON SCALP: ICD-10-CM

## 2024-12-26 DIAGNOSIS — J02.9 SORE THROAT: Primary | ICD-10-CM

## 2024-12-26 LAB — S PYO AG THROAT QL: NORMAL

## 2024-12-26 PROCEDURE — 87880 STREP A ASSAY W/OPTIC: CPT | Performed by: FAMILY MEDICINE

## 2024-12-26 PROCEDURE — 99213 OFFICE O/P EST LOW 20 MIN: CPT | Performed by: FAMILY MEDICINE

## 2024-12-26 RX ORDER — CLINDAMYCIN PHOSPHATE 10 MG/G
GEL TOPICAL
Qty: 60 G | Refills: 0 | Status: SHIPPED | OUTPATIENT
Start: 2024-12-26 | End: 2025-01-02

## 2024-12-26 RX ORDER — ACYCLOVIR 400 MG/1
400 TABLET ORAL
Qty: 50 TABLET | Refills: 0 | Status: SHIPPED | OUTPATIENT
Start: 2024-12-26 | End: 2025-01-05

## 2024-12-26 RX ORDER — METHYLPREDNISOLONE 4 MG/1
TABLET ORAL
Qty: 1 KIT | Refills: 0 | Status: SHIPPED | OUTPATIENT
Start: 2024-12-26

## 2024-12-26 NOTE — PROGRESS NOTES
Amount: 1 mg 20 tablet 0    phentermine (ADIPEX-P) 37.5 MG tablet       estradiol (ESTRACE VAGINAL) 0.1 MG/GM vaginal cream Place 1 g vaginally daily 42.5 g 3    progesterone (PROMETRIUM) 100 MG CAPS capsule TAKE 1 CAPSULE BY MOUTH EVERY NIGHT 90 capsule 1    levocetirizine (XYZAL) 5 MG tablet TAKE 1 TABLET BY MOUTH EVERY NIGHT AS NEEDED FOR ALLERGIES 90 tablet 3    escitalopram (LEXAPRO) 5 MG tablet Take 1 tablet by mouth daily 30 tablet 5    albuterol sulfate HFA (VENTOLIN HFA) 108 (90 Base) MCG/ACT inhaler Inhale 2 puffs into the lungs 4 times daily as needed for Wheezing 18 g 0    Spacer/Aero-Holding Chambers (AEROCHAMBER MV) MISC 1 each by Does not apply route daily 1 each 0    albuterol sulfate HFA (VENTOLIN HFA) 108 (90 Base) MCG/ACT inhaler Inhale 1 puff into the lungs every 6 hours as needed for Wheezing or Shortness of Breath 1 each 5    Multiple Vitamin (MULTIVITAMIN ADULT PO) Take by mouth      fluticasone (FLOVENT HFA) 110 MCG/ACT inhaler Inhale 2 puffs into the lungs daily as needed (wheezing or shortness of breath) 1 Inhaler 1     No current facility-administered medications for this visit.       PMH, Surgical Hx, Family Hx, and Social Hx reviewed and updated.  Health Maintenance reviewed.    Objective  Vitals:    12/26/24 1352   BP: 126/84   Pulse: 79   Temp: 97.9 °F (36.6 °C)   TempSrc: Temporal   SpO2: 99%   Weight: 77.6 kg (171 lb)     BP Readings from Last 3 Encounters:   12/26/24 126/84   10/02/24 122/80   09/17/24 130/88     Wt Readings from Last 3 Encounters:   12/26/24 77.6 kg (171 lb)   12/05/24 77.7 kg (171 lb 6.4 oz)   10/02/24 74.8 kg (165 lb)       Lab Results   Component Value Date    LABA1C 5.6 09/01/2021     Lab Results   Component Value Date    CREATININE 0.62 05/06/2024     Lab Results   Component Value Date    ALT 31 05/06/2024    AST 20 05/06/2024     Lab Results   Component Value Date    CHOL 198 01/22/2024    TRIG 130 01/22/2024    HDL 63 (H) 01/22/2024          Physical

## 2025-01-09 DIAGNOSIS — F41.0 PANIC ATTACKS: ICD-10-CM

## 2025-01-10 RX ORDER — LORAZEPAM 0.5 MG/1
0.5 TABLET ORAL 2 TIMES DAILY PRN
Qty: 20 TABLET | Refills: 0 | Status: SHIPPED | OUTPATIENT
Start: 2025-01-10 | End: 2025-01-20

## 2025-01-23 DIAGNOSIS — F41.0 PANIC ATTACKS: ICD-10-CM

## 2025-01-24 RX ORDER — LORAZEPAM 0.5 MG/1
0.5 TABLET ORAL 2 TIMES DAILY PRN
Qty: 20 TABLET | Refills: 0 | Status: SHIPPED | OUTPATIENT
Start: 2025-01-24 | End: 2025-02-03

## 2025-02-04 DIAGNOSIS — F41.0 PANIC ATTACKS: ICD-10-CM

## 2025-02-05 RX ORDER — LORAZEPAM 0.5 MG/1
0.5 TABLET ORAL 2 TIMES DAILY PRN
Qty: 20 TABLET | Refills: 0 | Status: SHIPPED | OUTPATIENT
Start: 2025-02-05 | End: 2025-02-15

## 2025-02-19 DIAGNOSIS — F41.0 PANIC ATTACKS: ICD-10-CM

## 2025-02-19 RX ORDER — LORAZEPAM 0.5 MG/1
0.5 TABLET ORAL 2 TIMES DAILY PRN
Qty: 20 TABLET | Refills: 0 | Status: SHIPPED | OUTPATIENT
Start: 2025-02-19 | End: 2025-03-01

## 2025-02-27 ENCOUNTER — PATIENT MESSAGE (OUTPATIENT)
Age: 45
End: 2025-02-27

## 2025-02-27 RX ORDER — PHENTERMINE HYDROCHLORIDE 37.5 MG/1
37.5 TABLET ORAL
Qty: 30 TABLET | Refills: 0 | Status: SHIPPED | OUTPATIENT
Start: 2025-02-27 | End: 2025-03-29

## 2025-03-03 DIAGNOSIS — F41.0 PANIC ATTACKS: ICD-10-CM

## 2025-03-04 RX ORDER — LORAZEPAM 0.5 MG/1
0.5 TABLET ORAL 2 TIMES DAILY PRN
Qty: 20 TABLET | Refills: 0 | Status: SHIPPED | OUTPATIENT
Start: 2025-03-04 | End: 2025-03-14

## 2025-03-10 ENCOUNTER — OFFICE VISIT (OUTPATIENT)
Age: 45
End: 2025-03-10
Payer: COMMERCIAL

## 2025-03-10 VITALS
HEIGHT: 65 IN | HEART RATE: 82 BPM | OXYGEN SATURATION: 99 % | TEMPERATURE: 98 F | BODY MASS INDEX: 28.62 KG/M2 | SYSTOLIC BLOOD PRESSURE: 122 MMHG | DIASTOLIC BLOOD PRESSURE: 78 MMHG | WEIGHT: 171.8 LBS

## 2025-03-10 DIAGNOSIS — F41.1 GENERALIZED ANXIETY DISORDER: Primary | ICD-10-CM

## 2025-03-10 DIAGNOSIS — F41.0 PANIC ATTACKS: ICD-10-CM

## 2025-03-10 DIAGNOSIS — I83.893 VARICOSE VEINS OF BILATERAL LOWER EXTREMITIES WITH OTHER COMPLICATIONS: ICD-10-CM

## 2025-03-10 PROCEDURE — 99213 OFFICE O/P EST LOW 20 MIN: CPT | Performed by: FAMILY MEDICINE

## 2025-03-10 RX ORDER — ESCITALOPRAM OXALATE 10 MG/1
10 TABLET ORAL DAILY
Qty: 90 TABLET | Refills: 1 | Status: SHIPPED | OUTPATIENT
Start: 2025-03-10

## 2025-03-10 SDOH — ECONOMIC STABILITY: FOOD INSECURITY: WITHIN THE PAST 12 MONTHS, YOU WORRIED THAT YOUR FOOD WOULD RUN OUT BEFORE YOU GOT MONEY TO BUY MORE.: NEVER TRUE

## 2025-03-10 SDOH — ECONOMIC STABILITY: INCOME INSECURITY: IN THE LAST 12 MONTHS, WAS THERE A TIME WHEN YOU WERE NOT ABLE TO PAY THE MORTGAGE OR RENT ON TIME?: NO

## 2025-03-10 SDOH — ECONOMIC STABILITY: FOOD INSECURITY: WITHIN THE PAST 12 MONTHS, THE FOOD YOU BOUGHT JUST DIDN'T LAST AND YOU DIDN'T HAVE MONEY TO GET MORE.: NEVER TRUE

## 2025-03-10 SDOH — ECONOMIC STABILITY: TRANSPORTATION INSECURITY
IN THE PAST 12 MONTHS, HAS THE LACK OF TRANSPORTATION KEPT YOU FROM MEDICAL APPOINTMENTS OR FROM GETTING MEDICATIONS?: NO

## 2025-03-10 ASSESSMENT — PATIENT HEALTH QUESTIONNAIRE - PHQ9
SUM OF ALL RESPONSES TO PHQ QUESTIONS 1-9: 6
1. LITTLE INTEREST OR PLEASURE IN DOING THINGS: SEVERAL DAYS
5. POOR APPETITE OR OVEREATING: SEVERAL DAYS
SUM OF ALL RESPONSES TO PHQ QUESTIONS 1-9: 6
4. FEELING TIRED OR HAVING LITTLE ENERGY: NEARLY EVERY DAY
3. TROUBLE FALLING OR STAYING ASLEEP: NOT AT ALL
8. MOVING OR SPEAKING SO SLOWLY THAT OTHER PEOPLE COULD HAVE NOTICED. OR THE OPPOSITE - BEING SO FIDGETY OR RESTLESS THAT YOU HAVE BEEN MOVING AROUND A LOT MORE THAN USUAL: NOT AT ALL
SUM OF ALL RESPONSES TO PHQ QUESTIONS 1-9: 6
2. FEELING DOWN, DEPRESSED OR HOPELESS: SEVERAL DAYS
1. LITTLE INTEREST OR PLEASURE IN DOING THINGS: SEVERAL DAYS
SUM OF ALL RESPONSES TO PHQ QUESTIONS 1-9: 6
3. TROUBLE FALLING OR STAYING ASLEEP: NOT AT ALL
5. POOR APPETITE OR OVEREATING: SEVERAL DAYS
2. FEELING DOWN, DEPRESSED OR HOPELESS: SEVERAL DAYS
10. IF YOU CHECKED OFF ANY PROBLEMS, HOW DIFFICULT HAVE THESE PROBLEMS MADE IT FOR YOU TO DO YOUR WORK, TAKE CARE OF THINGS AT HOME, OR GET ALONG WITH OTHER PEOPLE: SOMEWHAT DIFFICULT
7. TROUBLE CONCENTRATING ON THINGS, SUCH AS READING THE NEWSPAPER OR WATCHING TELEVISION: NOT AT ALL
9. THOUGHTS THAT YOU WOULD BE BETTER OFF DEAD, OR OF HURTING YOURSELF: NOT AT ALL
6. FEELING BAD ABOUT YOURSELF - OR THAT YOU ARE A FAILURE OR HAVE LET YOURSELF OR YOUR FAMILY DOWN: NOT AT ALL
SUM OF ALL RESPONSES TO PHQ QUESTIONS 1-9: 6
9. THOUGHTS THAT YOU WOULD BE BETTER OFF DEAD, OR OF HURTING YOURSELF: NOT AT ALL
4. FEELING TIRED OR HAVING LITTLE ENERGY: NEARLY EVERY DAY
10. IF YOU CHECKED OFF ANY PROBLEMS, HOW DIFFICULT HAVE THESE PROBLEMS MADE IT FOR YOU TO DO YOUR WORK, TAKE CARE OF THINGS AT HOME, OR GET ALONG WITH OTHER PEOPLE: SOMEWHAT DIFFICULT
7. TROUBLE CONCENTRATING ON THINGS, SUCH AS READING THE NEWSPAPER OR WATCHING TELEVISION: NOT AT ALL
8. MOVING OR SPEAKING SO SLOWLY THAT OTHER PEOPLE COULD HAVE NOTICED. OR THE OPPOSITE, BEING SO FIGETY OR RESTLESS THAT YOU HAVE BEEN MOVING AROUND A LOT MORE THAN USUAL: NOT AT ALL
6. FEELING BAD ABOUT YOURSELF - OR THAT YOU ARE A FAILURE OR HAVE LET YOURSELF OR YOUR FAMILY DOWN: NOT AT ALL

## 2025-03-10 NOTE — PROGRESS NOTES
children: Not on file    Years of education: Not on file    Highest education level: Not on file   Occupational History    Not on file   Tobacco Use    Smoking status: Every Day     Current packs/day: 1.00     Average packs/day: 1 pack/day for 9.2 years (9.2 ttl pk-yrs)     Types: Cigarettes     Start date: 2016    Smokeless tobacco: Never   Vaping Use    Vaping status: Every Day    Devices: Disposable, Pre-filled or refillable cartridge   Substance and Sexual Activity    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    Drug use: No    Sexual activity: Yes     Partners: Male     Comment: Im    Other Topics Concern    Not on file   Social History Narrative    Not on file     Social Drivers of Health     Financial Resource Strain: Low Risk  (12/4/2024)    Overall Financial Resource Strain (CARDIA)     Difficulty of Paying Living Expenses: Not very hard   Food Insecurity: No Food Insecurity (3/10/2025)    Hunger Vital Sign     Worried About Running Out of Food in the Last Year: Never true     Ran Out of Food in the Last Year: Never true   Transportation Needs: No Transportation Needs (3/10/2025)    PRAPARE - Transportation     Lack of Transportation (Medical): No     Lack of Transportation (Non-Medical): No   Physical Activity: Insufficiently Active (10/30/2023)    Exercise Vital Sign     Days of Exercise per Week: 3 days     Minutes of Exercise per Session: 30 min   Stress: Not on file   Social Connections: Not on file   Intimate Partner Violence: Not At Risk (10/30/2023)    Humiliation, Afraid, Rape, and Kick questionnaire     Fear of Current or Ex-Partner: No     Emotionally Abused: No     Physically Abused: No     Sexually Abused: No   Housing Stability: Low Risk  (3/10/2025)    Housing Stability Vital Sign     Unable to Pay for Housing in the Last Year: No     Number of Times Moved in the Last Year: 0     Homeless in the Last Year: No     Family History   Problem Relation Age of Onset    Cancer

## 2025-03-13 ENCOUNTER — PATIENT MESSAGE (OUTPATIENT)
Age: 45
End: 2025-03-13

## 2025-03-13 DIAGNOSIS — F41.0 PANIC ATTACKS: ICD-10-CM

## 2025-03-13 DIAGNOSIS — F41.1 GENERALIZED ANXIETY DISORDER: Primary | ICD-10-CM

## 2025-03-13 RX ORDER — LORAZEPAM 1 MG/1
1 TABLET ORAL NIGHTLY PRN
Qty: 30 TABLET | Refills: 0 | Status: SHIPPED | OUTPATIENT
Start: 2025-03-13 | End: 2025-04-12

## 2025-03-27 ENCOUNTER — PATIENT MESSAGE (OUTPATIENT)
Age: 45
End: 2025-03-27

## 2025-03-27 ENCOUNTER — TELEMEDICINE ON DEMAND (OUTPATIENT)
Age: 45
End: 2025-03-27
Payer: COMMERCIAL

## 2025-03-27 DIAGNOSIS — J06.9 VIRAL URI WITH COUGH: Primary | ICD-10-CM

## 2025-03-27 DIAGNOSIS — J02.9 SORETHROAT: ICD-10-CM

## 2025-03-27 PROCEDURE — 99213 OFFICE O/P EST LOW 20 MIN: CPT | Performed by: NURSE PRACTITIONER

## 2025-03-27 ASSESSMENT — ENCOUNTER SYMPTOMS
SORE THROAT: 1
SINUS PAIN: 1
COUGH: 1
SINUS PRESSURE: 1

## 2025-03-27 NOTE — PROGRESS NOTES
Verona Becerra (:  1980) is a Established patient, here for evaluation of the following:    Cold Symptoms (X2 days, sore throat, fatigue, fever (101.8), dry cough)       Assessment & Plan:  Below is the assessment and plan developed based on review of pertinent history, physical exam, labs, studies, and medications.  1. Viral URI with cough  Viral URI/ Bronchitis Symptom Management with over the counter treatments:  Symptoms may last up to 14 days but should improve significantly by day 7    Sore Throat:  Ice chips and warm drinks, throat lozenges, Ibuprofen as directed for dosing  Throat Coat Tea (box of bags in organic aisle at grocery)    Sinus Congestion:  May last up to 14 days  Saline Nasal Spray  Anabelle Pot Rinses with Saline  Nasal Steroid Spray (Nasonex, Flonase, Rhinocort- all OTC) after sinus rinsing twice daily  Sudafed 120mg twice daily if not hypertensive  Coricidan HBP or mucinex if hypertensive    Runny Nose:  Afrin nasal spray 3-5 days max  Chlortrimeton as package directed  [place] Pot irrigation  Steroid Nasal Spray  Benadryl at bedtime  OTC antihistamine non drowsy- Allegra, Zyrtec, Claritin    Cough:  May last up to 6 weeks  Cough suppressants- Delsym, \"DM\" containing products  Throat lozenges  Guaifenesin (Mucinex) for thick secretions, dink plenty of fluids with this    Call the office for:  Fever over 101  Symptoms worsen or fail to improve with OTC medications after 10 days  You have bloody phlegm or bloody sinus drainage  Change or worsening of symptoms    -     AMB POC SARS-COV-2 AND INFLUENZA A/B  2. Sorethroat  -     STREP SCREEN GROUP A THROAT  -     AMB POC SARS-COV-2 AND INFLUENZA A/B    No follow-ups on file.  The patient would benefit from future follow up with their usual PCP. As of the end of their Virtualist Visit today, follow up visit status is as follows: Patient to follow up as previously instructed by PCP    Subjective:   The patient complains of sore throat some

## 2025-04-02 RX ORDER — AMOXICILLIN 875 MG/1
875 TABLET, COATED ORAL 2 TIMES DAILY
Qty: 20 TABLET | Refills: 0 | Status: SHIPPED | OUTPATIENT
Start: 2025-04-02 | End: 2025-04-12

## 2025-04-02 RX ORDER — METHYLPREDNISOLONE 4 MG/1
TABLET ORAL
Qty: 1 KIT | Refills: 0 | Status: SHIPPED | OUTPATIENT
Start: 2025-04-02

## 2025-04-08 ENCOUNTER — OFFICE VISIT (OUTPATIENT)
Age: 45
End: 2025-04-08
Payer: COMMERCIAL

## 2025-04-08 VITALS
OXYGEN SATURATION: 99 % | DIASTOLIC BLOOD PRESSURE: 80 MMHG | BODY MASS INDEX: 29.79 KG/M2 | HEIGHT: 65 IN | SYSTOLIC BLOOD PRESSURE: 138 MMHG | WEIGHT: 178.8 LBS | TEMPERATURE: 98.6 F | HEART RATE: 84 BPM

## 2025-04-08 DIAGNOSIS — N95.1 MENOPAUSAL SWEATS: ICD-10-CM

## 2025-04-08 DIAGNOSIS — R53.82 CHRONIC FATIGUE: Primary | ICD-10-CM

## 2025-04-08 DIAGNOSIS — R50.81 FEVER IN OTHER DISEASES: ICD-10-CM

## 2025-04-08 DIAGNOSIS — R53.82 CHRONIC FATIGUE: ICD-10-CM

## 2025-04-08 LAB
ALBUMIN SERPL-MCNC: 4.4 G/DL (ref 3.5–4.6)
ALP SERPL-CCNC: 113 U/L (ref 40–130)
ALT SERPL-CCNC: 24 U/L (ref 0–33)
ANION GAP SERPL CALCULATED.3IONS-SCNC: 9 MEQ/L (ref 9–15)
AST SERPL-CCNC: 13 U/L (ref 0–35)
BASOPHILS # BLD: 0.1 K/UL (ref 0–0.2)
BASOPHILS NFR BLD: 0.7 %
BILIRUB SERPL-MCNC: <0.2 MG/DL (ref 0.2–0.7)
BUN SERPL-MCNC: 13 MG/DL (ref 6–20)
CALCIUM SERPL-MCNC: 9.4 MG/DL (ref 8.5–9.9)
CHLORIDE SERPL-SCNC: 101 MEQ/L (ref 95–107)
CO2 SERPL-SCNC: 29 MEQ/L (ref 20–31)
CREAT SERPL-MCNC: 0.6 MG/DL (ref 0.5–0.9)
CRP SERPL HS-MCNC: <3 MG/L (ref 0–5)
EOSINOPHIL # BLD: 0.1 K/UL (ref 0–0.7)
EOSINOPHIL NFR BLD: 1.1 %
ERYTHROCYTE [DISTWIDTH] IN BLOOD BY AUTOMATED COUNT: 13 % (ref 11.5–14.5)
ERYTHROCYTE [SEDIMENTATION RATE] IN BLOOD BY WESTERGREN METHOD: 18 MM (ref 0–20)
GLOBULIN SER CALC-MCNC: 3 G/DL (ref 2.3–3.5)
GLUCOSE SERPL-MCNC: 79 MG/DL (ref 70–99)
HCT VFR BLD AUTO: 41.8 % (ref 37–47)
HGB BLD-MCNC: 13.5 G/DL (ref 12–16)
LYMPHOCYTES # BLD: 2.3 K/UL (ref 1–4.8)
LYMPHOCYTES NFR BLD: 30.9 %
MCH RBC QN AUTO: 28.5 PG (ref 27–31.3)
MCHC RBC AUTO-ENTMCNC: 32.3 % (ref 33–37)
MCV RBC AUTO: 88.4 FL (ref 79.4–94.8)
MONOCYTES # BLD: 0.4 K/UL (ref 0.2–0.8)
MONOCYTES NFR BLD: 5.6 %
NEUTROPHILS # BLD: 4.5 K/UL (ref 1.4–6.5)
NEUTS SEG NFR BLD: 61.2 %
PLATELET # BLD AUTO: 333 K/UL (ref 130–400)
POTASSIUM SERPL-SCNC: 3.8 MEQ/L (ref 3.4–4.9)
PROT SERPL-MCNC: 7.4 G/DL (ref 6.3–8)
RBC # BLD AUTO: 4.73 M/UL (ref 4.2–5.4)
SODIUM SERPL-SCNC: 139 MEQ/L (ref 135–144)
WBC # BLD AUTO: 7.3 K/UL (ref 4.8–10.8)

## 2025-04-08 PROCEDURE — 99214 OFFICE O/P EST MOD 30 MIN: CPT | Performed by: FAMILY MEDICINE

## 2025-04-08 RX ORDER — PROGESTERONE 100 MG/1
100 CAPSULE ORAL NIGHTLY
Qty: 90 CAPSULE | Refills: 1 | Status: SHIPPED | OUTPATIENT
Start: 2025-04-08

## 2025-04-08 NOTE — PROGRESS NOTES
and medication use.    4. Medication Management.  - Advised to stop taking amoxicillin due to a rash that developed on her stomach and behind her neck.  - Completed the steroid pack, no further steroids prescribed.  - Discussed potential complications of mononucleosis and the importance of monitoring liver function and platelet counts through blood work.  1. Chronic fatigue  -     Ирина Barr Virus (Ebv) Antibody Panel I; Future  -     Ирина-Barr Virus Early Antigen Antibody, IgG; Future  -     C-Reactive Protein; Future  -     Sedimentation Rate; Future  2. Menopausal sweats  -     progesterone (PROMETRIUM) 100 MG CAPS capsule; Take 1 capsule by mouth nightly, Disp-90 capsule, R-1Normal  3. Fever in other diseases  -     Ирина Barr Virus (Ebv) Antibody Panel I; Future  -     Ирина-Barr Virus Early Antigen Antibody, IgG; Future  -     C-Reactive Protein; Future  -     Sedimentation Rate; Future  -     Comprehensive Metabolic Panel; Future  -     CBC with Auto Differential; Future       No follow-ups on file.    The patient (or guardian, if applicable) and other individuals in attendance with the patient were advised that Artificial Intelligence will be utilized during this visit to record, process the conversation to generate a clinical note and to support improvement of the AI technology. The patient (or guardian, if applicable) and other individuals in attendance at the appointment consented to the use of AI, including the recording.      An electronic signature was used to authenticate this note.    --NEVILLE LARSON, DO

## 2025-04-10 DIAGNOSIS — F41.1 GENERALIZED ANXIETY DISORDER: ICD-10-CM

## 2025-04-10 DIAGNOSIS — F41.0 PANIC ATTACKS: ICD-10-CM

## 2025-04-10 RX ORDER — LORAZEPAM 1 MG/1
1 TABLET ORAL NIGHTLY PRN
Qty: 30 TABLET | Refills: 0 | Status: SHIPPED | OUTPATIENT
Start: 2025-04-10 | End: 2025-05-10

## 2025-04-12 LAB
EBV EA-D IGG SER-ACNC: <5 U/ML (ref 0–10.9)
EBV NA IGG SER IA-ACNC: 224 U/ML (ref 0–21.9)
EBV VCA IGG SER IA-ACNC: >750 U/ML (ref 0–21.9)
EBV VCA IGM SER IA-ACNC: 15.3 U/ML (ref 0–43.9)

## 2025-04-14 ENCOUNTER — PATIENT MESSAGE (OUTPATIENT)
Age: 45
End: 2025-04-14

## 2025-04-16 RX ORDER — NYSTATIN 100000 [USP'U]/ML
500000 SUSPENSION ORAL 4 TIMES DAILY
Qty: 200 ML | Refills: 0 | Status: SHIPPED | OUTPATIENT
Start: 2025-04-16 | End: 2025-04-26

## 2025-04-23 ENCOUNTER — OFFICE VISIT (OUTPATIENT)
Age: 45
End: 2025-04-23
Payer: COMMERCIAL

## 2025-04-23 VITALS
TEMPERATURE: 98.3 F | SYSTOLIC BLOOD PRESSURE: 124 MMHG | HEART RATE: 68 BPM | OXYGEN SATURATION: 100 % | DIASTOLIC BLOOD PRESSURE: 72 MMHG | WEIGHT: 179.4 LBS | BODY MASS INDEX: 29.89 KG/M2 | HEIGHT: 65 IN

## 2025-04-23 DIAGNOSIS — R53.82 CHRONIC FATIGUE: Primary | ICD-10-CM

## 2025-04-23 DIAGNOSIS — R50.9 FEVER, UNSPECIFIED FEVER CAUSE: ICD-10-CM

## 2025-04-23 DIAGNOSIS — R53.82 CHRONIC FATIGUE: ICD-10-CM

## 2025-04-23 LAB
CRP SERPL HS-MCNC: <3 MG/L (ref 0–5)
ERYTHROCYTE [SEDIMENTATION RATE] IN BLOOD BY WESTERGREN METHOD: 22 MM (ref 0–20)
LDH SERPL-CCNC: 230 U/L (ref 135–214)
TSH REFLEX: 2.23 UIU/ML (ref 0.44–3.86)

## 2025-04-23 PROCEDURE — 99214 OFFICE O/P EST MOD 30 MIN: CPT | Performed by: FAMILY MEDICINE

## 2025-04-23 NOTE — PROGRESS NOTES
antibodies will be investigated; blood culture will be conducted to rule out these conditions.  - Advised to incorporate green tea capsules into the diet for a natural energy boost; methylene blue may be considered as a treatment option for chronic fatigue and potential mitochondrial dysfunction if lab results return normal and there is no evidence of bacterial infection.    2. Fever.  - Reported low-grade fevers of 100.7°F and 101.2°F on consecutive nights, managed with Motrin.  - No other symptoms except sore throat, fatigue, occasional dizziness, and lightheadedness.  - Cause of fever will be investigated through blood culture and additional labs to rule out bacterial infection, thyroid issues, and autoimmune disorders.  - Further evaluation will be conducted to ensure no underlying serious condition.    3. Sore throat.  - Reports a sore throat, managed with a rinse.  - No other significant symptoms related to the sore throat.  - Symptom will be monitored, and further evaluation will be conducted if it persists or worsens.  - Continued use of vitamin C packets and multivitamins recommended.    4. Varicose veins.  - Reports improvement in varicose veins since not being on feet as much.  - No additional treatment required at this time.  - Monitoring for any changes or worsening of symptoms.  - Encouraged to maintain current activity level and rest as needed.  1. Chronic fatigue  -     GURPREET Screen With Reflex; Future  -     Culture, Blood 1; Future  -     TSH reflex to FT4; Future  -     C-Reactive Protein; Future  -     Sedimentation Rate; Future  -     Lactate Dehydrogenase; Future  2. Fever, unspecified fever cause  -     GURPREET Screen With Reflex; Future  -     Culture, Blood 1; Future  -     TSH reflex to FT4; Future  -     C-Reactive Protein; Future  -     Sedimentation Rate; Future  -     Lactate Dehydrogenase; Future       No follow-ups on file.    The patient (or guardian, if applicable) and other individuals

## 2025-04-24 LAB — BACTERIA BLD CULT: NORMAL

## 2025-04-25 ENCOUNTER — RESULTS FOLLOW-UP (OUTPATIENT)
Age: 45
End: 2025-04-25

## 2025-04-25 LAB — NUCLEAR IGG SER QL IA: NORMAL

## 2025-04-28 DIAGNOSIS — R50.9 FEVER, UNSPECIFIED FEVER CAUSE: ICD-10-CM

## 2025-04-28 DIAGNOSIS — R53.82 CHRONIC FATIGUE: ICD-10-CM

## 2025-04-28 LAB — BACTERIA BLD CULT: NORMAL

## 2025-04-30 LAB
B BURGDOR IGG SER QL IB: NEGATIVE
B BURGDOR IGM SER QL IB: NEGATIVE

## 2025-05-02 DIAGNOSIS — F41.0 PANIC ATTACKS: ICD-10-CM

## 2025-05-02 DIAGNOSIS — F41.1 GENERALIZED ANXIETY DISORDER: ICD-10-CM

## 2025-05-05 RX ORDER — LORAZEPAM 1 MG/1
TABLET ORAL
Qty: 45 TABLET | Refills: 0 | Status: SHIPPED | OUTPATIENT
Start: 2025-05-05 | End: 2025-06-05

## 2025-05-08 ENCOUNTER — TELEPHONE (OUTPATIENT)
Age: 45
End: 2025-05-08

## 2025-05-08 NOTE — TELEPHONE ENCOUNTER
Mina from [facility] called in to check the status of the fax that was sent over to us. I confirmed per Esteban it was received. Ref # [id number]

## 2025-05-27 ENCOUNTER — OFFICE VISIT (OUTPATIENT)
Age: 45
End: 2025-05-27
Payer: COMMERCIAL

## 2025-05-27 VITALS
SYSTOLIC BLOOD PRESSURE: 126 MMHG | HEART RATE: 67 BPM | HEIGHT: 65 IN | BODY MASS INDEX: 30.32 KG/M2 | TEMPERATURE: 97.4 F | WEIGHT: 182 LBS | DIASTOLIC BLOOD PRESSURE: 78 MMHG | OXYGEN SATURATION: 96 %

## 2025-05-27 DIAGNOSIS — R53.82 CHRONIC FATIGUE: Primary | ICD-10-CM

## 2025-05-27 DIAGNOSIS — B27.90 EPSTEIN BARR VIRUS INFECTION: ICD-10-CM

## 2025-05-27 DIAGNOSIS — F41.0 PANIC ATTACKS: ICD-10-CM

## 2025-05-27 PROCEDURE — 99214 OFFICE O/P EST MOD 30 MIN: CPT | Performed by: FAMILY MEDICINE

## 2025-05-27 RX ORDER — PHENTERMINE HYDROCHLORIDE 37.5 MG/1
37.5 TABLET ORAL
Qty: 30 TABLET | Refills: 0 | Status: SHIPPED | OUTPATIENT
Start: 2025-05-27 | End: 2025-06-26

## 2025-05-27 NOTE — PROGRESS NOTES
children: Not on file    Years of education: Not on file    Highest education level: Not on file   Occupational History    Not on file   Tobacco Use    Smoking status: Every Day     Current packs/day: 1.00     Average packs/day: 1 pack/day for 9.4 years (9.4 ttl pk-yrs)     Types: Cigarettes     Start date: 2016    Smokeless tobacco: Never   Vaping Use    Vaping status: Every Day    Devices: Disposable, Pre-filled or refillable cartridge   Substance and Sexual Activity    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    Drug use: No    Sexual activity: Yes     Partners: Male     Comment: Im    Other Topics Concern    Not on file   Social History Narrative    Not on file     Social Drivers of Health     Financial Resource Strain: Low Risk  (12/4/2024)    Overall Financial Resource Strain (CARDIA)     Difficulty of Paying Living Expenses: Not very hard   Food Insecurity: No Food Insecurity (3/10/2025)    Hunger Vital Sign     Worried About Running Out of Food in the Last Year: Never true     Ran Out of Food in the Last Year: Never true   Transportation Needs: No Transportation Needs (3/10/2025)    PRAPARE - Transportation     Lack of Transportation (Medical): No     Lack of Transportation (Non-Medical): No   Physical Activity: Insufficiently Active (10/30/2023)    Exercise Vital Sign     Days of Exercise per Week: 3 days     Minutes of Exercise per Session: 30 min   Stress: Not on file   Social Connections: Not on file   Intimate Partner Violence: Not At Risk (10/30/2023)    Humiliation, Afraid, Rape, and Kick questionnaire     Fear of Current or Ex-Partner: No     Emotionally Abused: No     Physically Abused: No     Sexually Abused: No   Housing Stability: Low Risk  (3/10/2025)    Housing Stability Vital Sign     Unable to Pay for Housing in the Last Year: No     Number of Times Moved in the Last Year: 0     Homeless in the Last Year: No     Family History   Problem Relation Age of Onset    Cancer

## 2025-06-02 RX ORDER — PHENTERMINE HYDROCHLORIDE 37.5 MG/1
37.5 TABLET ORAL
Qty: 30 TABLET | Refills: 0 | Status: SHIPPED | OUTPATIENT
Start: 2025-06-02 | End: 2025-07-02

## 2025-06-05 DIAGNOSIS — F41.1 GENERALIZED ANXIETY DISORDER: ICD-10-CM

## 2025-06-05 DIAGNOSIS — F41.0 PANIC ATTACKS: ICD-10-CM

## 2025-06-05 RX ORDER — LORAZEPAM 1 MG/1
TABLET ORAL
Qty: 45 TABLET | Refills: 0 | Status: SHIPPED | OUTPATIENT
Start: 2025-06-05 | End: 2025-07-06

## 2025-06-23 ENCOUNTER — OFFICE VISIT (OUTPATIENT)
Age: 45
End: 2025-06-23
Payer: COMMERCIAL

## 2025-06-23 VITALS
SYSTOLIC BLOOD PRESSURE: 136 MMHG | OXYGEN SATURATION: 98 % | WEIGHT: 181 LBS | DIASTOLIC BLOOD PRESSURE: 82 MMHG | HEIGHT: 65 IN | HEART RATE: 82 BPM | BODY MASS INDEX: 30.16 KG/M2 | TEMPERATURE: 96.9 F

## 2025-06-23 DIAGNOSIS — F41.1 GENERALIZED ANXIETY DISORDER: ICD-10-CM

## 2025-06-23 DIAGNOSIS — R53.82 CHRONIC FATIGUE: ICD-10-CM

## 2025-06-23 DIAGNOSIS — F43.10 PTSD (POST-TRAUMATIC STRESS DISORDER): Primary | ICD-10-CM

## 2025-06-23 DIAGNOSIS — J45.20 MILD INTERMITTENT ASTHMA, UNSPECIFIED WHETHER COMPLICATED: ICD-10-CM

## 2025-06-23 DIAGNOSIS — F41.0 PANIC ATTACKS: ICD-10-CM

## 2025-06-23 DIAGNOSIS — N95.1 MENOPAUSAL SWEATS: ICD-10-CM

## 2025-06-23 PROCEDURE — 99213 OFFICE O/P EST LOW 20 MIN: CPT | Performed by: FAMILY MEDICINE

## 2025-06-23 RX ORDER — PROGESTERONE 100 MG/1
100 CAPSULE ORAL NIGHTLY
Qty: 30 CAPSULE | Refills: 5 | Status: SHIPPED | OUTPATIENT
Start: 2025-06-23

## 2025-06-23 RX ORDER — ALBUTEROL SULFATE 90 UG/1
1 INHALANT RESPIRATORY (INHALATION) EVERY 6 HOURS PRN
Qty: 1 EACH | Refills: 5 | Status: SHIPPED | OUTPATIENT
Start: 2025-06-23

## 2025-06-23 NOTE — PROGRESS NOTES
Verona Becerra (:  1980) is a 45 y.o. female, Established patient, here for evaluation of the following chief complaint(s):  Follow-up (4 week follow up due to chronic fatigue, flavia barr flare up. Would like to return to work 25. She is feeling less tired overall, but continues to feel like she does not have any stamina. ) and Weight Management (She was started on Adipex at her last visit. She is taking this as directed, without adverse effects. Denies chest pains, palpitations, headaches, constipation or trouble sleeping. She is down 1lb since her last visit.)          Subjective   History of Present Illness  The patient presents for evaluation of anxiety, chronic fatigue, and medication management.    She reports an improvement in her condition, with a readiness to resume work. However, she continues to experience fatigue, though less severe than before. She has been unable to procure her diet medication from [employer] due to unavailability but has managed to obtain it from [facility]. She is currently on Adipex, which has resulted in a decreased appetite, leading her to supplement her diet with protein shakes. She has been adhering to the prescribed exercise regimen.    She reports that her anxiety is still significant and sometimes triggered by life events and childhood trauma. She has not yet started therapy as expected and is awaiting contact from the therapy provider. She does not believe that Adipex is worsening her anxiety, although it may heighten it slightly. She has been taking Lexapro, which she feels has helped manage her depression and emotional responses.    She is seeking a refill of her albuterol inhaler, which she uses sparingly since starting allergy medication. She anticipates increased use during an upcoming trip to [place] for a baseball tournament.    She also reports that her estrogen medication causes irritation after 4 days of use, prompting her to discontinue it for a few days.

## 2025-07-02 DIAGNOSIS — F41.0 PANIC ATTACKS: ICD-10-CM

## 2025-07-02 DIAGNOSIS — F41.1 GENERALIZED ANXIETY DISORDER: ICD-10-CM

## 2025-07-02 RX ORDER — LORAZEPAM 1 MG/1
TABLET ORAL
Qty: 45 TABLET | Refills: 0 | Status: SHIPPED | OUTPATIENT
Start: 2025-07-02 | End: 2025-08-02

## 2025-07-08 ENCOUNTER — PATIENT MESSAGE (OUTPATIENT)
Age: 45
End: 2025-07-08

## 2025-07-08 RX ORDER — PHENTERMINE HYDROCHLORIDE 37.5 MG/1
37.5 TABLET ORAL
Qty: 30 TABLET | Refills: 0 | Status: SHIPPED | OUTPATIENT
Start: 2025-07-08 | End: 2025-08-07

## 2025-07-10 ENCOUNTER — PATIENT MESSAGE (OUTPATIENT)
Age: 45
End: 2025-07-10

## 2025-07-10 RX ORDER — ACYCLOVIR 400 MG/1
400 TABLET ORAL 3 TIMES DAILY
Qty: 21 TABLET | Refills: 0 | Status: SHIPPED | OUTPATIENT
Start: 2025-07-10 | End: 2025-07-17

## 2025-07-21 ENCOUNTER — PATIENT MESSAGE (OUTPATIENT)
Age: 45
End: 2025-07-21

## 2025-07-21 DIAGNOSIS — R21 RASH AND NONSPECIFIC SKIN ERUPTION: Primary | ICD-10-CM

## 2025-07-25 ENCOUNTER — PATIENT MESSAGE (OUTPATIENT)
Age: 45
End: 2025-07-25

## 2025-07-25 DIAGNOSIS — F41.1 GENERALIZED ANXIETY DISORDER: Primary | ICD-10-CM

## 2025-07-27 DIAGNOSIS — F41.0 PANIC ATTACKS: ICD-10-CM

## 2025-07-27 DIAGNOSIS — F41.1 GENERALIZED ANXIETY DISORDER: ICD-10-CM

## 2025-07-28 RX ORDER — ESCITALOPRAM OXALATE 10 MG/1
10 TABLET ORAL DAILY
Qty: 30 TABLET | Refills: 5 | Status: SHIPPED | OUTPATIENT
Start: 2025-07-28

## 2025-07-28 RX ORDER — BUSPIRONE HYDROCHLORIDE 5 MG/1
5 TABLET ORAL 3 TIMES DAILY
Qty: 90 TABLET | Refills: 0 | Status: SHIPPED | OUTPATIENT
Start: 2025-07-28 | End: 2025-08-27

## 2025-07-28 NOTE — TELEPHONE ENCOUNTER
Patient comment: I have 6 left, the pharmacy only gives me a 30 day supply. They sent me a message I am due for a refill.    [Negative] : Heme/Lymph [Anxiety] : anxiety

## 2025-07-30 ENCOUNTER — PATIENT MESSAGE (OUTPATIENT)
Age: 45
End: 2025-07-30

## 2025-07-30 RX ORDER — HYDROXYZINE HYDROCHLORIDE 25 MG/1
25 TABLET, FILM COATED ORAL DAILY PRN
Qty: 30 TABLET | Refills: 0 | Status: SHIPPED | OUTPATIENT
Start: 2025-07-30 | End: 2025-08-29

## 2025-08-04 DIAGNOSIS — F41.1 GENERALIZED ANXIETY DISORDER: ICD-10-CM

## 2025-08-04 DIAGNOSIS — F41.0 PANIC ATTACKS: ICD-10-CM

## 2025-08-05 ENCOUNTER — OFFICE VISIT (OUTPATIENT)
Age: 45
End: 2025-08-05
Payer: COMMERCIAL

## 2025-08-05 VITALS
HEIGHT: 65 IN | HEART RATE: 76 BPM | SYSTOLIC BLOOD PRESSURE: 114 MMHG | TEMPERATURE: 97.4 F | DIASTOLIC BLOOD PRESSURE: 64 MMHG | BODY MASS INDEX: 30.49 KG/M2 | WEIGHT: 183 LBS | OXYGEN SATURATION: 98 %

## 2025-08-05 DIAGNOSIS — F41.1 GENERALIZED ANXIETY DISORDER: Primary | ICD-10-CM

## 2025-08-05 DIAGNOSIS — F41.0 PANIC ATTACKS: ICD-10-CM

## 2025-08-05 DIAGNOSIS — H10.021 OTHER MUCOPURULENT CONJUNCTIVITIS OF RIGHT EYE: ICD-10-CM

## 2025-08-05 DIAGNOSIS — E66.811 CLASS 1 OBESITY: ICD-10-CM

## 2025-08-05 PROCEDURE — 99214 OFFICE O/P EST MOD 30 MIN: CPT | Performed by: FAMILY MEDICINE

## 2025-08-05 RX ORDER — DOXYCYCLINE 100 MG/1
CAPSULE ORAL
COMMUNITY
Start: 2025-07-24

## 2025-08-05 RX ORDER — LORAZEPAM 1 MG/1
TABLET ORAL
Qty: 45 TABLET | Refills: 0 | Status: SHIPPED | OUTPATIENT
Start: 2025-08-05 | End: 2025-09-04

## 2025-08-05 RX ORDER — HYDROCORTISONE 25 MG/G
CREAM TOPICAL
COMMUNITY
Start: 2025-07-24

## 2025-08-05 RX ORDER — OFLOXACIN 3 MG/ML
1 SOLUTION/ DROPS OPHTHALMIC 4 TIMES DAILY
Qty: 5 ML | Refills: 0 | Status: SHIPPED | OUTPATIENT
Start: 2025-08-05

## 2025-08-25 DIAGNOSIS — F41.0 PANIC ATTACKS: ICD-10-CM

## 2025-08-25 DIAGNOSIS — F41.1 GENERALIZED ANXIETY DISORDER: Primary | ICD-10-CM

## 2025-08-25 RX ORDER — LORAZEPAM 0.5 MG/1
0.5 TABLET ORAL 2 TIMES DAILY
Qty: 60 TABLET | Refills: 0 | Status: SHIPPED | OUTPATIENT
Start: 2025-08-25 | End: 2025-09-24